# Patient Record
Sex: FEMALE | Race: WHITE | NOT HISPANIC OR LATINO | Employment: OTHER | URBAN - NONMETROPOLITAN AREA
[De-identification: names, ages, dates, MRNs, and addresses within clinical notes are randomized per-mention and may not be internally consistent; named-entity substitution may affect disease eponyms.]

---

## 2023-03-10 ENCOUNTER — TELEPHONE (OUTPATIENT)
Dept: PRIMARY CARE | Facility: CLINIC | Age: 73
End: 2023-03-10
Payer: MEDICARE

## 2023-03-10 DIAGNOSIS — M54.32 SCIATICA OF LEFT SIDE: Primary | ICD-10-CM

## 2023-03-10 NOTE — TELEPHONE ENCOUNTER
Patient called and states that she needs her gabapentin refilled, this is to be sent over to [facility]

## 2023-03-12 PROBLEM — M25.50 JOINT PAIN: Status: ACTIVE | Noted: 2023-03-12

## 2023-03-12 PROBLEM — M54.30 SCIATICA: Status: ACTIVE | Noted: 2023-03-12

## 2023-03-12 RX ORDER — GABAPENTIN 100 MG/1
100 CAPSULE ORAL 2 TIMES DAILY
COMMUNITY
Start: 2020-09-30 | End: 2023-03-12 | Stop reason: SDUPTHER

## 2023-03-12 RX ORDER — GABAPENTIN 100 MG/1
CAPSULE ORAL
Qty: 120 CAPSULE | Refills: 3 | Status: SHIPPED | OUTPATIENT
Start: 2023-03-12 | End: 2023-07-03 | Stop reason: SDUPTHER

## 2023-03-22 PROBLEM — N39.41 URINARY INCONTINENCE, URGE: Status: ACTIVE | Noted: 2023-03-22

## 2023-03-22 PROBLEM — R39.9 UTI SYMPTOMS: Status: ACTIVE | Noted: 2023-03-22

## 2023-03-22 PROBLEM — E66.9 CLASS 1 OBESITY WITH BODY MASS INDEX (BMI) OF 33.0 TO 33.9 IN ADULT: Status: ACTIVE | Noted: 2023-03-22

## 2023-03-22 PROBLEM — R06.2 WHEEZING: Status: ACTIVE | Noted: 2023-03-22

## 2023-03-22 PROBLEM — J02.9 SORE THROAT: Status: ACTIVE | Noted: 2023-03-22

## 2023-03-22 PROBLEM — R30.0 BURNING WITH URINATION: Status: ACTIVE | Noted: 2023-03-22

## 2023-03-22 PROBLEM — M85.80 OSTEOPENIA: Status: ACTIVE | Noted: 2023-03-22

## 2023-03-22 PROBLEM — B37.31 YEAST VAGINITIS: Status: ACTIVE | Noted: 2023-03-22

## 2023-03-22 PROBLEM — R76.8 POSITIVE SM/RNP ANTIBODY: Status: ACTIVE | Noted: 2023-03-22

## 2023-03-22 PROBLEM — R10.2 PELVIC PRESSURE IN FEMALE: Status: ACTIVE | Noted: 2023-03-22

## 2023-03-22 PROBLEM — I48.0 PAROXYSMAL ATRIAL FIBRILLATION (MULTI): Status: ACTIVE | Noted: 2023-03-22

## 2023-03-22 PROBLEM — F41.9 ANXIETY: Status: ACTIVE | Noted: 2023-03-22

## 2023-03-22 PROBLEM — I48.91 A-FIB (MULTI): Status: ACTIVE | Noted: 2023-03-22

## 2023-03-22 PROBLEM — K21.9 ESOPHAGEAL REFLUX: Status: ACTIVE | Noted: 2023-03-22

## 2023-03-22 PROBLEM — R31.9 HEMATURIA: Status: ACTIVE | Noted: 2023-03-22

## 2023-03-22 PROBLEM — E66.811 CLASS 1 OBESITY WITH BODY MASS INDEX (BMI) OF 33.0 TO 33.9 IN ADULT: Status: ACTIVE | Noted: 2023-03-22

## 2023-03-22 PROBLEM — E78.5 HYPERLIPIDEMIA: Status: ACTIVE | Noted: 2023-03-22

## 2023-03-22 PROBLEM — R39.15 URINARY URGENCY: Status: ACTIVE | Noted: 2023-03-22

## 2023-03-22 PROBLEM — H65.90 SOM (SEROUS OTITIS MEDIA): Status: ACTIVE | Noted: 2023-03-22

## 2023-03-22 RX ORDER — LEVOCETIRIZINE DIHYDROCHLORIDE 5 MG/1
5 TABLET, FILM COATED ORAL
COMMUNITY
Start: 2021-08-20

## 2023-03-22 RX ORDER — VITAMIN E (DL,TOCOPHERYL ACET) 90 MG
400 CAPSULE ORAL
COMMUNITY

## 2023-03-22 RX ORDER — PSYLLIUM HUSK 3.4 G/12G
1 POWDER ORAL DAILY
COMMUNITY
Start: 2022-09-26

## 2023-03-22 RX ORDER — ALBUTEROL SULFATE 90 UG/1
2 AEROSOL, METERED RESPIRATORY (INHALATION) EVERY 4 HOURS PRN
COMMUNITY
Start: 2013-09-13

## 2023-03-22 RX ORDER — WARFARIN 1 MG/1
2 TABLET ORAL DAILY
COMMUNITY
Start: 2021-10-26 | End: 2023-05-15

## 2023-03-22 RX ORDER — MIRABEGRON 50 MG/1
50 TABLET, FILM COATED, EXTENDED RELEASE ORAL NIGHTLY
COMMUNITY
Start: 2023-02-21 | End: 2023-12-14 | Stop reason: SDUPTHER

## 2023-03-22 RX ORDER — FLUTICASONE PROPIONATE 50 MCG
SPRAY, SUSPENSION (ML) NASAL
COMMUNITY
Start: 2012-11-02 | End: 2023-12-18 | Stop reason: SDUPTHER

## 2023-03-22 RX ORDER — WARFARIN 6 MG/1
6 TABLET ORAL DAILY
COMMUNITY
Start: 2020-05-13 | End: 2023-08-22 | Stop reason: SDUPTHER

## 2023-03-22 RX ORDER — LOVASTATIN 40 MG/1
40 TABLET ORAL NIGHTLY
COMMUNITY
Start: 2013-08-09 | End: 2023-08-22 | Stop reason: SDUPTHER

## 2023-03-22 RX ORDER — PANTOPRAZOLE SODIUM 40 MG/1
40 TABLET, DELAYED RELEASE ORAL
COMMUNITY
Start: 2013-09-27 | End: 2023-08-22 | Stop reason: SDUPTHER

## 2023-03-22 RX ORDER — ESTRADIOL 0.1 MG/G
1 CREAM VAGINAL NIGHTLY
COMMUNITY
Start: 2022-09-26 | End: 2024-05-02 | Stop reason: ALTCHOICE

## 2023-03-22 RX ORDER — ESCITALOPRAM OXALATE 10 MG/1
10 TABLET ORAL NIGHTLY
COMMUNITY
Start: 2020-08-10 | End: 2023-07-03 | Stop reason: SDUPTHER

## 2023-03-22 RX ORDER — PSYLLIUM HUSK 0.4 G
1 CAPSULE ORAL
COMMUNITY
Start: 2020-09-30

## 2023-03-28 ENCOUNTER — CLINICAL SUPPORT (OUTPATIENT)
Dept: PRIMARY CARE | Facility: CLINIC | Age: 73
End: 2023-03-28
Payer: COMMERCIAL

## 2023-03-28 DIAGNOSIS — I48.91 ATRIAL FIBRILLATION, UNSPECIFIED TYPE (MULTI): Primary | ICD-10-CM

## 2023-03-28 LAB
POC INR: 2.7 (ref 0.9–1.1)
POC PROTHROMBIN TIME: 2.7 (ref 9.3–12.5)

## 2023-03-28 PROCEDURE — 85610 PROTHROMBIN TIME: CPT | Performed by: NURSE PRACTITIONER

## 2023-04-25 ENCOUNTER — CLINICAL SUPPORT (OUTPATIENT)
Dept: PRIMARY CARE | Facility: CLINIC | Age: 73
End: 2023-04-25
Payer: MEDICARE

## 2023-04-25 LAB
POC INR: 1.8
POC PROTHROMBIN TIME: NORMAL

## 2023-04-25 PROCEDURE — 85610 PROTHROMBIN TIME: CPT | Performed by: INTERNAL MEDICINE

## 2023-05-04 ENCOUNTER — CLINICAL SUPPORT (OUTPATIENT)
Dept: PRIMARY CARE | Facility: CLINIC | Age: 73
End: 2023-05-04
Payer: MEDICARE

## 2023-05-04 LAB
POC INR: 2.3
POC PROTHROMBIN TIME: NORMAL

## 2023-05-04 PROCEDURE — 85610 PROTHROMBIN TIME: CPT | Performed by: INTERNAL MEDICINE

## 2023-05-13 DIAGNOSIS — I48.0 PAROXYSMAL ATRIAL FIBRILLATION (MULTI): Primary | ICD-10-CM

## 2023-05-15 RX ORDER — WARFARIN 1 MG/1
TABLET ORAL
Qty: 180 TABLET | Refills: 3 | Status: SHIPPED | OUTPATIENT
Start: 2023-05-15 | End: 2023-12-18 | Stop reason: SDUPTHER

## 2023-06-05 ENCOUNTER — APPOINTMENT (OUTPATIENT)
Dept: PRIMARY CARE | Facility: CLINIC | Age: 73
End: 2023-06-05
Payer: MEDICARE

## 2023-07-03 DIAGNOSIS — F41.9 ANXIETY: Primary | ICD-10-CM

## 2023-07-03 DIAGNOSIS — M54.32 SCIATICA OF LEFT SIDE: ICD-10-CM

## 2023-07-04 RX ORDER — ESCITALOPRAM OXALATE 10 MG/1
10 TABLET ORAL NIGHTLY
Qty: 90 TABLET | Refills: 3 | Status: SHIPPED | OUTPATIENT
Start: 2023-07-04 | End: 2024-07-03

## 2023-07-04 RX ORDER — GABAPENTIN 100 MG/1
CAPSULE ORAL
Qty: 120 CAPSULE | Refills: 3 | Status: SHIPPED | OUTPATIENT
Start: 2023-07-04 | End: 2024-05-22 | Stop reason: SDUPTHER

## 2023-07-10 ENCOUNTER — APPOINTMENT (OUTPATIENT)
Dept: PRIMARY CARE | Facility: CLINIC | Age: 73
End: 2023-07-10
Payer: MEDICARE

## 2023-07-24 PROBLEM — J01.90 ACUTE SINUSITIS: Status: RESOLVED | Noted: 2023-07-24 | Resolved: 2023-07-24

## 2023-07-24 PROBLEM — J06.9 ACUTE UPPER RESPIRATORY INFECTION: Status: RESOLVED | Noted: 2023-07-24 | Resolved: 2023-07-24

## 2023-07-24 PROBLEM — R06.02 SOB (SHORTNESS OF BREATH) ON EXERTION: Status: ACTIVE | Noted: 2023-07-24

## 2023-07-25 ENCOUNTER — OFFICE VISIT (OUTPATIENT)
Dept: PRIMARY CARE | Facility: CLINIC | Age: 73
End: 2023-07-25
Payer: MEDICARE

## 2023-07-25 ENCOUNTER — TELEPHONE (OUTPATIENT)
Dept: PRIMARY CARE | Facility: CLINIC | Age: 73
End: 2023-07-25

## 2023-07-25 ENCOUNTER — LAB (OUTPATIENT)
Dept: LAB | Facility: LAB | Age: 73
End: 2023-07-25
Payer: MEDICARE

## 2023-07-25 VITALS
DIASTOLIC BLOOD PRESSURE: 72 MMHG | HEIGHT: 63 IN | SYSTOLIC BLOOD PRESSURE: 124 MMHG | OXYGEN SATURATION: 97 % | BODY MASS INDEX: 32.28 KG/M2 | HEART RATE: 67 BPM | WEIGHT: 182.2 LBS

## 2023-07-25 DIAGNOSIS — E78.2 MIXED HYPERLIPIDEMIA: ICD-10-CM

## 2023-07-25 DIAGNOSIS — K21.00 GASTROESOPHAGEAL REFLUX DISEASE WITH ESOPHAGITIS WITHOUT HEMORRHAGE: ICD-10-CM

## 2023-07-25 DIAGNOSIS — Z12.31 VISIT FOR SCREENING MAMMOGRAM: ICD-10-CM

## 2023-07-25 DIAGNOSIS — M25.50 PAIN IN JOINT, MULTIPLE SITES: ICD-10-CM

## 2023-07-25 DIAGNOSIS — E66.09 CLASS 1 OBESITY DUE TO EXCESS CALORIES WITH SERIOUS COMORBIDITY AND BODY MASS INDEX (BMI) OF 32.0 TO 32.9 IN ADULT: ICD-10-CM

## 2023-07-25 DIAGNOSIS — Z79.01 ANTICOAGULANT LONG-TERM USE: ICD-10-CM

## 2023-07-25 DIAGNOSIS — Z78.0 POST-MENOPAUSAL: ICD-10-CM

## 2023-07-25 DIAGNOSIS — F41.9 ANXIETY: ICD-10-CM

## 2023-07-25 DIAGNOSIS — I48.91 ATRIAL FIBRILLATION, UNSPECIFIED TYPE (MULTI): Primary | ICD-10-CM

## 2023-07-25 DIAGNOSIS — Z78.0 MENOPAUSE: ICD-10-CM

## 2023-07-25 DIAGNOSIS — Z13.820 SCREENING FOR OSTEOPOROSIS: ICD-10-CM

## 2023-07-25 DIAGNOSIS — I48.91 ATRIAL FIBRILLATION, UNSPECIFIED TYPE (MULTI): ICD-10-CM

## 2023-07-25 PROBLEM — R39.15 URINARY URGENCY: Status: RESOLVED | Noted: 2023-03-22 | Resolved: 2023-07-25

## 2023-07-25 PROBLEM — R31.9 HEMATURIA: Status: RESOLVED | Noted: 2023-03-22 | Resolved: 2023-07-25

## 2023-07-25 PROBLEM — B37.31 YEAST VAGINITIS: Status: RESOLVED | Noted: 2023-03-22 | Resolved: 2023-07-25

## 2023-07-25 PROBLEM — J02.9 SORE THROAT: Status: RESOLVED | Noted: 2023-03-22 | Resolved: 2023-07-25

## 2023-07-25 PROBLEM — R39.9 UTI SYMPTOMS: Status: RESOLVED | Noted: 2023-03-22 | Resolved: 2023-07-25

## 2023-07-25 PROBLEM — R30.0 BURNING WITH URINATION: Status: RESOLVED | Noted: 2023-03-22 | Resolved: 2023-07-25

## 2023-07-25 PROBLEM — E66.9 CLASS 1 OBESITY WITH BODY MASS INDEX (BMI) OF 33.0 TO 33.9 IN ADULT: Status: RESOLVED | Noted: 2023-03-22 | Resolved: 2023-07-25

## 2023-07-25 PROBLEM — E66.811 CLASS 1 OBESITY WITH BODY MASS INDEX (BMI) OF 33.0 TO 33.9 IN ADULT: Status: RESOLVED | Noted: 2023-03-22 | Resolved: 2023-07-25

## 2023-07-25 PROBLEM — H65.90 SOM (SEROUS OTITIS MEDIA): Status: RESOLVED | Noted: 2023-03-22 | Resolved: 2023-07-25

## 2023-07-25 LAB
ALANINE AMINOTRANSFERASE (SGPT) (U/L) IN SER/PLAS: 15 U/L (ref 7–45)
ALBUMIN (G/DL) IN SER/PLAS: 4.2 G/DL (ref 3.4–5)
ALKALINE PHOSPHATASE (U/L) IN SER/PLAS: 79 U/L (ref 33–136)
ANION GAP IN SER/PLAS: 13 MMOL/L (ref 10–20)
ASPARTATE AMINOTRANSFERASE (SGOT) (U/L) IN SER/PLAS: 20 U/L (ref 9–39)
BILIRUBIN TOTAL (MG/DL) IN SER/PLAS: 0.5 MG/DL (ref 0–1.2)
CALCIUM (MG/DL) IN SER/PLAS: 9.6 MG/DL (ref 8.6–10.3)
CARBON DIOXIDE, TOTAL (MMOL/L) IN SER/PLAS: 29 MMOL/L (ref 21–32)
CHLORIDE (MMOL/L) IN SER/PLAS: 105 MMOL/L (ref 98–107)
CHOLESTEROL (MG/DL) IN SER/PLAS: 172 MG/DL (ref 0–199)
CHOLESTEROL IN HDL (MG/DL) IN SER/PLAS: 62.6 MG/DL
CHOLESTEROL/HDL RATIO: 2.7
CREATININE (MG/DL) IN SER/PLAS: 0.73 MG/DL (ref 0.5–1.05)
ERYTHROCYTE DISTRIBUTION WIDTH (RATIO) BY AUTOMATED COUNT: 13.4 % (ref 11.5–14.5)
ERYTHROCYTE MEAN CORPUSCULAR HEMOGLOBIN CONCENTRATION (G/DL) BY AUTOMATED: 31 G/DL (ref 32–36)
ERYTHROCYTE MEAN CORPUSCULAR VOLUME (FL) BY AUTOMATED COUNT: 94 FL (ref 80–100)
ERYTHROCYTES (10*6/UL) IN BLOOD BY AUTOMATED COUNT: 4.8 X10E12/L (ref 4–5.2)
GFR FEMALE: 88 ML/MIN/1.73M2
GLUCOSE (MG/DL) IN SER/PLAS: 83 MG/DL (ref 74–99)
HEMATOCRIT (%) IN BLOOD BY AUTOMATED COUNT: 45.1 % (ref 36–46)
HEMOGLOBIN (G/DL) IN BLOOD: 14 G/DL (ref 12–16)
LDL: 89 MG/DL (ref 0–99)
LEUKOCYTES (10*3/UL) IN BLOOD BY AUTOMATED COUNT: 5 X10E9/L (ref 4.4–11.3)
PLATELETS (10*3/UL) IN BLOOD AUTOMATED COUNT: 231 X10E9/L (ref 150–450)
POC INR: 2.5 (ref 0.9–1.1)
POC PROTHROMBIN TIME: 2.5 (ref 9.3–12.5)
POTASSIUM (MMOL/L) IN SER/PLAS: 4.3 MMOL/L (ref 3.5–5.3)
PROTEIN TOTAL: 6.3 G/DL (ref 6.4–8.2)
SODIUM (MMOL/L) IN SER/PLAS: 143 MMOL/L (ref 136–145)
TRIGLYCERIDE (MG/DL) IN SER/PLAS: 103 MG/DL (ref 0–149)
URATE (MG/DL) IN SER/PLAS: 4.8 MG/DL (ref 2.3–6.7)
UREA NITROGEN (MG/DL) IN SER/PLAS: 24 MG/DL (ref 6–23)
VLDL: 21 MG/DL (ref 0–40)

## 2023-07-25 PROCEDURE — 99215 OFFICE O/P EST HI 40 MIN: CPT | Performed by: NURSE PRACTITIONER

## 2023-07-25 PROCEDURE — 90677 PCV20 VACCINE IM: CPT | Performed by: NURSE PRACTITIONER

## 2023-07-25 PROCEDURE — 1125F AMNT PAIN NOTED PAIN PRSNT: CPT | Performed by: NURSE PRACTITIONER

## 2023-07-25 PROCEDURE — 1159F MED LIST DOCD IN RCRD: CPT | Performed by: NURSE PRACTITIONER

## 2023-07-25 PROCEDURE — 84550 ASSAY OF BLOOD/URIC ACID: CPT

## 2023-07-25 PROCEDURE — 85610 PROTHROMBIN TIME: CPT | Performed by: NURSE PRACTITIONER

## 2023-07-25 PROCEDURE — 36415 COLL VENOUS BLD VENIPUNCTURE: CPT

## 2023-07-25 PROCEDURE — 3008F BODY MASS INDEX DOCD: CPT | Performed by: NURSE PRACTITIONER

## 2023-07-25 PROCEDURE — 85027 COMPLETE CBC AUTOMATED: CPT

## 2023-07-25 PROCEDURE — 1036F TOBACCO NON-USER: CPT | Performed by: NURSE PRACTITIONER

## 2023-07-25 PROCEDURE — G0009 ADMIN PNEUMOCOCCAL VACCINE: HCPCS | Performed by: NURSE PRACTITIONER

## 2023-07-25 PROCEDURE — 80053 COMPREHEN METABOLIC PANEL: CPT

## 2023-07-25 PROCEDURE — 80061 LIPID PANEL: CPT

## 2023-07-25 ASSESSMENT — COLUMBIA-SUICIDE SEVERITY RATING SCALE - C-SSRS
2. HAVE YOU ACTUALLY HAD ANY THOUGHTS OF KILLING YOURSELF?: NO
6. HAVE YOU EVER DONE ANYTHING, STARTED TO DO ANYTHING, OR PREPARED TO DO ANYTHING TO END YOUR LIFE?: NO
1. IN THE PAST MONTH, HAVE YOU WISHED YOU WERE DEAD OR WISHED YOU COULD GO TO SLEEP AND NOT WAKE UP?: NO

## 2023-07-25 ASSESSMENT — ENCOUNTER SYMPTOMS
BACK PAIN: 1
OCCASIONAL FEELINGS OF UNSTEADINESS: 0
CARDIOVASCULAR NEGATIVE: 1
GASTROINTESTINAL NEGATIVE: 1
NEUROLOGICAL NEGATIVE: 1
CONSTITUTIONAL NEGATIVE: 1
RESPIRATORY NEGATIVE: 1
DEPRESSION: 0
PSYCHIATRIC NEGATIVE: 1
LOSS OF SENSATION IN FEET: 0

## 2023-07-25 ASSESSMENT — PATIENT HEALTH QUESTIONNAIRE - PHQ9
1. LITTLE INTEREST OR PLEASURE IN DOING THINGS: NOT AT ALL
SUM OF ALL RESPONSES TO PHQ9 QUESTIONS 1 AND 2: 0
2. FEELING DOWN, DEPRESSED OR HOPELESS: NOT AT ALL

## 2023-07-25 NOTE — TELEPHONE ENCOUNTER
Result Communication    Resulted Orders   Lipid Panel   Result Value Ref Range    Cholesterol 172 0 - 199 mg/dL      Comment:      .      AGE      DESIRABLE   BORDERLINE HIGH   HIGH     0-19 Y     0 - 169       170 - 199     >/= 200    20-24 Y     0 - 189       190 - 224     >/= 225         >24 Y     0 - 199       200 - 239     >/= 240   **All ranges are based on fasting samples. Specific   therapeutic targets will vary based on patient-specific   cardiac risk.  .   Pediatric guidelines reference:Pediatrics 2011, 128(S5).   Adult guidelines reference: NCEP ATPIII Guidelines,     STEPHEN 2001, 258:2486-97  .   Venipuncture immediately after or during the    administration of Metamizole may lead to falsely   low results. Testing should be performed immediately   prior to Metamizole dosing.    HDL 62.6 mg/dL      Comment:      .      AGE      VERY LOW   LOW     NORMAL    HIGH       0-19 Y       < 35   < 40     40-45     ----    20-24 Y       ----   < 40       >45     ----      >24 Y       ----   < 40     40-60      >60  .    Cholesterol/HDL Ratio 2.7       Comment:      REF VALUES  DESIRABLE  < 3.4  HIGH RISK  > 5.0    LDL 89 0 - 99 mg/dL      Comment:      .                           NEAR      BORD      AGE      DESIRABLE  OPTIMAL    HIGH     HIGH     VERY HIGH     0-19 Y     0 - 109     ---    110-129   >/= 130     ----    20-24 Y     0 - 119     ---    120-159   >/= 160     ----      >24 Y     0 -  99   100-129  130-159   160-189     >/=190  .    VLDL 21 0 - 40 mg/dL    Triglycerides 103 0 - 149 mg/dL      Comment:      .      AGE      DESIRABLE   BORDERLINE HIGH   HIGH     VERY HIGH   0 D-90 D    19 - 174         ----         ----        ----  91 D- 9 Y     0 -  74        75 -  99     >/= 100      ----    10-19 Y     0 -  89        90 - 129     >/= 130      ----    20-24 Y     0 - 114       115 - 149     >/= 150      ----         >24 Y     0 - 149       150 - 199    200- 499    >/= 500  .   Venipuncture immediately  after or during the    administration of Metamizole may lead to falsely   low results. Testing should be performed immediately   prior to Metamizole dosing.   Comprehensive Metabolic Panel   Result Value Ref Range    Glucose 83 74 - 99 mg/dL    Sodium 143 136 - 145 mmol/L    Potassium 4.3 3.5 - 5.3 mmol/L    Chloride 105 98 - 107 mmol/L    Bicarbonate 29 21 - 32 mmol/L    Anion Gap 13 10 - 20 mmol/L    Urea Nitrogen 24 (H) 6 - 23 mg/dL    Creatinine 0.73 0.50 - 1.05 mg/dL    GFR Female 88 >90 mL/min/1.73m2      Comment:       CALCULATIONS OF ESTIMATED GFR ARE PERFORMED   USING THE 2021 CKD-EPI STUDY REFIT EQUATION   WITHOUT THE RACE VARIABLE FOR THE IDMS-TRACEABLE   CREATININE METHODS.    https://jasn.asnjournals.org/content/early/2021/09/22/ASN.4247806637    Calcium 9.6 8.6 - 10.3 mg/dL    Albumin 4.2 3.4 - 5.0 g/dL    Alkaline Phosphatase 79 33 - 136 U/L    Total Protein 6.3 (L) 6.4 - 8.2 g/dL    AST 20 9 - 39 U/L    Total Bilirubin 0.5 0.0 - 1.2 mg/dL    ALT (SGPT) 15 7 - 45 U/L      Comment:       Patients treated with Sulfasalazine may generate    falsely decreased results for ALT.   CBC   Result Value Ref Range    WBC 5.0 4.4 - 11.3 x10E9/L    RBC 4.80 4.00 - 5.20 x10E12/L    Hemoglobin 14.0 12.0 - 16.0 g/dL    Hematocrit 45.1 36.0 - 46.0 %    MCV 94 80 - 100 fL    MCHC 31.0 (L) 32.0 - 36.0 g/dL    Platelets 231 150 - 450 x10E9/L    RDW 13.4 11.5 - 14.5 %   Uric Acid   Result Value Ref Range    Uric Acid 4.8 2.3 - 6.7 mg/dL      Comment:       Venipuncture immediately after or during the    administration of Metamizole may lead to falsely   low results. Testing should be performed immediately   prior to Metamizole dosing.       5:32 PM      Results were successfully communicated with the patient and they acknowledged their understanding.

## 2023-07-25 NOTE — TELEPHONE ENCOUNTER
----- Message from DIANA Nicholson-CNP sent at 7/25/2023  4:20 PM EDT -----  Lab resultsCMP shows dehydration.  Make sure you are drinking plenty of fluids otherwise stable.  CBC with no significant abnormalities.  Cholesterol well within normal limits continue to exercise eat healthy.  Recheck the uric acid level due to history of that right hand pain/possible gout as she was told in the past.  And is well within normal limits.

## 2023-07-25 NOTE — PATIENT INSTRUCTIONS
Please continue to remain active eat a healthy well-balanced diet.    Please continue to follow-up for your INRs    Routine labs ordered once completed we will notify of results    If you could schedule Medicare wellness before the end of the year in December.    Typically routine follow-ups to be every 6 months sooner if needed    Very nice to meet you          Current weight: 82.6 kg (182 lb 3.2 oz)  Weight change since last visit (-) denotes wt loss -3.87 lbs   Weight loss needed to achieve BMI 25: 41.4 Lbs  Weight loss needed to achieve BMI 30: 13.2 Lbs        Ways to Help Prevent Falls at Home    Quick Tips   ? Ask for help if you need it. Most people want to help!   ? Get up slowly after sitting or laying down   ? Wear a medical alert device or keep cell phone in your pocket   ? Use night lights, especially areas near a bathroom   ? Keep the items you use often within reach on a small stool or end table   ? Use an assistive device such as walker or cane, as directed by provider/physical therapy   ? Use a non-slip mat and grab bars in your bathroom. Look for home health sections for best options     Other Areas to Focus On   ? Exercise and nutrition: Regular exercise or taking a falls prevention class are great ways improve strength and balance. Don’t forget to stay hydrated and bring a snack!   ? Medicine side effects: Some medicines can make you sleepy or dizzy, which could cause a fall. Ask your healthcare provider about the side effects your medicines could cause. Be sure to let them know if you take any vitamins or supplements as well.   ? Tripping hazards: Remove items you could trip on, such as loose mats, rugs, cords, and clutter. Wear closed toe shoes with rubber soles.   ? Health and wellness: Get regular checkups with your healthcare provider, plus routine vision and hearing screenings. Talk with your healthcare provider about:   o Your medicines and the possible side effects - bring them in a bag if  that is easier!   o Problems with balance or feeling dizzy   o Ways to promote bone health, such as Vitamin D and calcium supplements   o Questions or concerns about falling     *Ask your healthcare team if you have questions     ©[facility], 2022

## 2023-07-25 NOTE — PROGRESS NOTES
"Subjective   Patient ID: Mariposa Comer is a 72 y.o. female who presents for Establish Care.    Sees Dr. Lizama for Afib, INR 2.9 7/10/23.   Cardiac Ablation- done in 2020.   Dr. Kelly- urinary incontinence  Frequent [place]/Restless Leg Syndrome- taking gabapentin   Anxiety- has Son in [place], patient is still on Lexapro. Patient is stable on medication.   EGD/Colonoscopy- 2 years ago.   Asthma- 1-2X a month for inhaler use.   Staying active,tries to eat healthy         Review of Systems   Constitutional: Negative.    HENT: Negative.     Respiratory: Negative.     Cardiovascular: Negative.    Gastrointestinal: Negative.    Genitourinary: Negative.    Musculoskeletal:  Positive for back pain.   Neurological: Negative.    Psychiatric/Behavioral: Negative.         Objective   /72   Pulse 67   Ht 1.6 m (5' 3\")   Wt 82.6 kg (182 lb 3.2 oz)   SpO2 97%   BMI 32.28 kg/m²     Physical Exam  Vitals reviewed.   Constitutional:       Appearance: Normal appearance.   HENT:      Head: Normocephalic.   Cardiovascular:      Rate and Rhythm: Normal rate. Rhythm irregular.      Heart sounds: Normal heart sounds.   Pulmonary:      Effort: Pulmonary effort is normal.      Breath sounds: Normal breath sounds.   Skin:     General: Skin is warm.   Neurological:      General: No focal deficit present.      Mental Status: She is alert and oriented to person, place, and time.         Assessment/Plan   Problem List Items Addressed This Visit       Anxiety    A-fib (CMS/[facility]) - Primary    Relevant Orders    Lipid Panel    Comprehensive Metabolic Panel    CBC    POCT INR manually resulted    Esophageal reflux    Relevant Orders    CBC    Hyperlipidemia    Relevant Orders    Lipid Panel     Other Visit Diagnoses       Class 1 obesity due to excess calories with serious comorbidity and body mass index (BMI) of 32.0 to 32.9 in adult        Relevant Orders    Lipid Panel    Pain in joint, multiple sites        Relevant Orders    Uric " Acid    Visit for screening mammogram        Relevant Orders    BI mammo bilateral screening tomosynthesis    Menopause        Relevant Orders    XR DEXA bone density    Screening for osteoporosis        Relevant Orders    XR DEXA bone density    Post-menopausal        Anticoagulant long-term use        Relevant Orders    POCT INR manually resulted        Extended time spent over 45+ minutes with patient as well as reviewing previous medical history in chart lab work.  Patient is not new to [facility] however new to me.     Patient was identified as a fall risk. Risk prevention instructions provided.

## 2023-08-22 ENCOUNTER — LAB (OUTPATIENT)
Dept: LAB | Facility: LAB | Age: 73
End: 2023-08-22
Payer: MEDICARE

## 2023-08-22 ENCOUNTER — TELEPHONE (OUTPATIENT)
Dept: PRIMARY CARE | Facility: CLINIC | Age: 73
End: 2023-08-22

## 2023-08-22 ENCOUNTER — CLINICAL SUPPORT (OUTPATIENT)
Dept: PRIMARY CARE | Facility: CLINIC | Age: 73
End: 2023-08-22
Payer: MEDICARE

## 2023-08-22 DIAGNOSIS — I48.0 PAROXYSMAL ATRIAL FIBRILLATION (MULTI): ICD-10-CM

## 2023-08-22 DIAGNOSIS — I48.91 ATRIAL FIBRILLATION, UNSPECIFIED TYPE (MULTI): ICD-10-CM

## 2023-08-22 DIAGNOSIS — E78.5 HYPERLIPIDEMIA, UNSPECIFIED HYPERLIPIDEMIA TYPE: ICD-10-CM

## 2023-08-22 DIAGNOSIS — K21.00 GASTROESOPHAGEAL REFLUX DISEASE WITH ESOPHAGITIS WITHOUT HEMORRHAGE: ICD-10-CM

## 2023-08-22 LAB
POC INR: 2.8
POC PROTHROMBIN TIME: NORMAL

## 2023-08-22 PROCEDURE — 85610 PROTHROMBIN TIME: CPT | Performed by: NURSE PRACTITIONER

## 2023-08-22 RX ORDER — PANTOPRAZOLE SODIUM 40 MG/1
40 TABLET, DELAYED RELEASE ORAL
Qty: 90 TABLET | Refills: 1 | Status: SHIPPED | OUTPATIENT
Start: 2023-08-22 | End: 2023-12-18 | Stop reason: SDUPTHER

## 2023-08-22 RX ORDER — WARFARIN 6 MG/1
TABLET ORAL
Qty: 45 TABLET | Refills: 2 | Status: SHIPPED | OUTPATIENT
Start: 2023-08-22 | End: 2023-12-18 | Stop reason: SDUPTHER

## 2023-08-22 RX ORDER — LOVASTATIN 40 MG/1
40 TABLET ORAL NIGHTLY
Qty: 90 TABLET | Refills: 1 | Status: SHIPPED | OUTPATIENT
Start: 2023-08-22 | End: 2023-12-18 | Stop reason: SDUPTHER

## 2023-08-22 NOTE — PATIENT INSTRUCTIONS
Coumadin (warfarin) is an anticoagulant, or blood thinner, that works by inhibiting the action of vitamin K-dependent clotting factors in the blood. The effectiveness of warfarin is monitored using the International Normalized Ratio (INR). This is a blood test that measures the time it takes for blood to clot.    When on warfarin, it's essential to maintain a consistent intake of vitamin K, as it can influence the INR. Sudden increases or decreases in vitamin K intake can lead to changes in INR, potentially making the blood too thin (high INR) or not thin enough (low INR).    Dietary Recommendations for Patients on Warfarin (Coumadin):    **Maintain a consistent intake of vitamin K  **: This doesn't mean avoiding vitamin K-rich foods but rather trying to keep your daily intake about the same.    Foods high in vitamin K:    Green leafy vegetables such as kale, spinach, turnip greens, collards, Swiss chard, mustard greens, and parsley.  Broccoli, [place] sprouts, and cabbage.  Some vegetable oils, like canola and soybean oil.  Green tea.  Foods with moderate vitamin K content:    Lettuce, blueberries, okra, cauliflower, and kiwi.  Soybeans and soy products.  Be cautious with alcohol: It can increase the effect of warfarin, leading to a higher INR. It's recommended to limit alcohol or discuss its consumption with your healthcare provider.    Be aware of herbal supplements: Many supplements can interact with warfarin. For example, cranberry, garlic, gingko, and ginseng can potentially affect INR. Always discuss any supplements or herbal products with your healthcare provider.    Check with your doctor when adding new medications: Both prescription and over-the-counter medications can interact with warfarin. For instance, medications like aspirin, NSAIDs (like ibuprofen), certain antibiotics, and many others can affect how warfarin works.    If INR is too high (indicating the blood is too thin):    This increases the  risk of bleeding. Your doctor might adjust your warfarin dosage or ask about any recent changes in diet, supplements, or medications.  Be extra cautious to avoid injuries, and be vigilant for signs of bleeding, such as unusual bruising, prolonged bleeding from cuts, bloody stools or urine, or prolonged nosebleeds.  If INR is too low (indicating the blood is not thin enough):    This increases the risk of clot formation. As with a high INR, your doctor may adjust your warfarin dose or investigate potential causes like changes in diet, supplements, or other medications.  Be vigilant for signs of a blood clot, such as unexplained swelling, pain, skin discoloration, or difficulty breathing.  Consistent communication with your healthcare provider is vital. Regular monitoring and adjustments, as well as attention to diet and medications, can help keep INR levels within the desired range and reduce the risks associated with too high or too low INR.    Please follow up in 4 weeks for repeat INR.

## 2023-08-22 NOTE — TELEPHONE ENCOUNTER
Rx Refill Request Telephone Encounter    Name:  Mariposa Comer  :  [id number]  Medication Name:    LOVASTATIN 40MG Q/D   PANTOPRAZOLE 40MG Q/D   WARFARIN 6MG 1/2 PILL Q/D 90 DAY SUPPLY    Specific Pharmacy location:  [facility]   Date of last appointment:  2023  Date of next appointment:  2023  Best number to reach patient:  [phone]

## 2023-08-30 LAB — URINE CULTURE: ABNORMAL

## 2023-09-21 ENCOUNTER — CLINICAL SUPPORT (OUTPATIENT)
Dept: PRIMARY CARE | Facility: CLINIC | Age: 73
End: 2023-09-21
Payer: MEDICARE

## 2023-09-21 DIAGNOSIS — I48.0 PAROXYSMAL ATRIAL FIBRILLATION (MULTI): ICD-10-CM

## 2023-09-21 LAB
POC INR: 3.6
POC PROTHROMBIN TIME: NORMAL

## 2023-09-21 PROCEDURE — 85610 PROTHROMBIN TIME: CPT | Performed by: NURSE PRACTITIONER

## 2023-09-21 NOTE — PROGRESS NOTES
Coumadin (warfarin) is an anticoagulant, or blood thinner, that works by inhibiting the action of vitamin K-dependent clotting factors in the blood. The effectiveness of warfarin is monitored using the International Normalized Ratio (INR). This is a blood test that measures the time it takes for blood to clot.     When on warfarin, it's essential to maintain a consistent intake of vitamin K, as it can influence the INR. Sudden increases or decreases in vitamin K intake can lead to changes in INR, potentially making the blood too thin (high INR) or not thin enough (low INR).     Dietary Recommendations for Patients on Warfarin (Coumadin):     **Maintain a consistent intake of vitamin K  **: This doesn't mean avoiding vitamin K-rich foods but rather trying to keep your daily intake about the same.     Foods high in vitamin K:     Green leafy vegetables such as kale, spinach, turnip greens, collards, Swiss chard, mustard greens, and parsley.  Broccoli, [place] sprouts, and cabbage.  Some vegetable oils, like canola and soybean oil.  Green tea.  Foods with moderate vitamin K content:     Lettuce, blueberries, okra, cauliflower, and kiwi.  Soybeans and soy products.  Be cautious with alcohol: It can increase the effect of warfarin, leading to a higher INR. It's recommended to limit alcohol or discuss its consumption with your healthcare provider.     Be aware of herbal supplements: Many supplements can interact with warfarin. For example, cranberry, garlic, gingko, and ginseng can potentially affect INR. Always discuss any supplements or herbal products with your healthcare provider.     Check with your doctor when adding new medications: Both prescription and over-the-counter medications can interact with warfarin. For instance, medications like aspirin, NSAIDs (like ibuprofen), certain antibiotics, and many others can affect how warfarin works.     If INR is too high (indicating the blood is too thin):     This  increases the risk of bleeding. Your doctor might adjust your warfarin dosage or ask about any recent changes in diet, supplements, or medications.  Be extra cautious to avoid injuries, and be vigilant for signs of bleeding, such as unusual bruising, prolonged bleeding from cuts, bloody stools or urine, or prolonged nosebleeds.  If INR is too low (indicating the blood is not thin enough):     This increases the risk of clot formation. As with a high INR, your doctor may adjust your warfarin dose or investigate potential causes like changes in diet, supplements, or other medications.  Be vigilant for signs of a blood clot, such as unexplained swelling, pain, skin discoloration, or difficulty breathing.  Consistent communication with your healthcare provider is vital. Regular monitoring and adjustments, as well as attention to diet and medications, can help keep INR levels within the desired range and reduce the risks associated with too high or too low INR.     Please follow up on Monday 9/25 for repeat INR.

## 2023-09-25 ENCOUNTER — CLINICAL SUPPORT (OUTPATIENT)
Dept: PRIMARY CARE | Facility: CLINIC | Age: 73
End: 2023-09-25
Payer: MEDICARE

## 2023-09-25 DIAGNOSIS — I48.0 PAROXYSMAL ATRIAL FIBRILLATION (MULTI): ICD-10-CM

## 2023-09-25 LAB
POC INR: 1.2
POC PROTHROMBIN TIME: NORMAL

## 2023-09-25 PROCEDURE — 85610 PROTHROMBIN TIME: CPT | Performed by: NURSE PRACTITIONER

## 2023-09-28 ENCOUNTER — CLINICAL SUPPORT (OUTPATIENT)
Dept: PRIMARY CARE | Facility: CLINIC | Age: 73
End: 2023-09-28
Payer: MEDICARE

## 2023-09-28 DIAGNOSIS — I48.91 ATRIAL FIBRILLATION, UNSPECIFIED TYPE (MULTI): ICD-10-CM

## 2023-09-28 LAB
POC INR: 1.7
POC PROTHROMBIN TIME: NORMAL

## 2023-09-28 PROCEDURE — 85610 PROTHROMBIN TIME: CPT | Performed by: NURSE PRACTITIONER

## 2023-10-06 ENCOUNTER — CLINICAL SUPPORT (OUTPATIENT)
Dept: PRIMARY CARE | Facility: CLINIC | Age: 73
End: 2023-10-06
Payer: MEDICARE

## 2023-10-06 DIAGNOSIS — I48.0 PAROXYSMAL ATRIAL FIBRILLATION (MULTI): ICD-10-CM

## 2023-10-06 LAB
POC INR: 3.2 (ref 2–3)
POC PROTHROMBIN TIME: ABNORMAL

## 2023-10-16 ENCOUNTER — CLINICAL SUPPORT (OUTPATIENT)
Dept: PRIMARY CARE | Facility: CLINIC | Age: 73
End: 2023-10-16
Payer: MEDICARE

## 2023-10-16 DIAGNOSIS — I48.91 ATRIAL FIBRILLATION, UNSPECIFIED TYPE (MULTI): ICD-10-CM

## 2023-10-16 LAB
POC INR: 2
POC PROTHROMBIN TIME: NORMAL

## 2023-10-16 PROCEDURE — 85610 PROTHROMBIN TIME: CPT | Performed by: NURSE PRACTITIONER

## 2023-10-20 ENCOUNTER — TELEMEDICINE (OUTPATIENT)
Dept: PRIMARY CARE | Facility: CLINIC | Age: 73
End: 2023-10-20
Payer: MEDICARE

## 2023-10-20 VITALS — OXYGEN SATURATION: 96 % | BODY MASS INDEX: 31.89 KG/M2 | WEIGHT: 180 LBS | TEMPERATURE: 100 F | HEART RATE: 63 BPM

## 2023-10-20 DIAGNOSIS — U07.1 COVID-19: Primary | ICD-10-CM

## 2023-10-20 PROCEDURE — 99212 OFFICE O/P EST SF 10 MIN: CPT | Performed by: FAMILY MEDICINE

## 2023-10-20 RX ORDER — NIRMATRELVIR AND RITONAVIR 300-100 MG
3 KIT ORAL 2 TIMES DAILY
Qty: 30 TABLET | Refills: 0 | Status: SHIPPED | OUTPATIENT
Start: 2023-10-20 | End: 2023-10-26 | Stop reason: ALTCHOICE

## 2023-10-20 ASSESSMENT — ENCOUNTER SYMPTOMS
SHORTNESS OF BREATH: 0
BLOOD IN STOOL: 0
SORE THROAT: 1
VOMITING: 0
TROUBLE SWALLOWING: 0
ABDOMINAL PAIN: 0
NAUSEA: 0
WEAKNESS: 0
DIARRHEA: 0
CONFUSION: 0
DIFFICULTY URINATING: 0
RHINORRHEA: 1
DIZZINESS: 0
LIGHT-HEADEDNESS: 0
DYSURIA: 0
FEVER: 1
WHEEZING: 0
FATIGUE: 1
COUGH: 1
NUMBNESS: 0
CHILLS: 0
UNEXPECTED WEIGHT CHANGE: 0

## 2023-10-20 NOTE — ASSESSMENT & PLAN NOTE
Mild illness.  Day 1, wishes to use Paxlovid. Discussed EUA status and drug interactions and precautions. Last dose of Lovastatin was more than 12 hours ago, last night, and advised her to avoid taking Lovastatin while taking Paxlovid, and for least 5 days after completing Paxlovid.  Skip tonight's dose of warfarin, have INR checked after 3 days.

## 2023-10-20 NOTE — PATIENT INSTRUCTIONS
Do not take Lovastatin within 12 hours of your first dose of Paxlovid, or within 5 days after your last dose of Paxlovid.    Skip tonight's dose of Warfarin, and have your INR checked after you have been taking Paxlovid for 3 days.    If taken, Paxlovid should be started within 5 days of the onset of symptoms. Availability and eligibility criteria are subject to change. Please carefully review the fact sheets and drug interactions prior to taking Paxlovid.    Paxlovid (nirmatrelvir and ritonavir tablets)  https://labeling.[employer].com/ShowLabeling.aspx?qe=15944  https://www.fda.gov/media/056830/download    Drug interaction check  https://ackqm14-fbzkkqxuiakrblps.org/     You should seek immediate medical attention or call 911 if you have any new or worsening shortness of breath, difficulty breathing, chest/side/back pain, pain or swelling in your arms or legs, or other concern.    Isolation may be discontinued under the following conditions:  1 - At least 5 days have passed since symptom onset (or since testing positive if asymptomatic) and symptoms are improving, AND  2 - At least 24 hours have passed since resolution of fever without the use of fever-reducing medications, AND  3 - A mask is worn for at least 10 days from onset of symptoms or testing positive if asymptomatic.    A limited number of persons with severe illness may produce replication-competent virus beyond 10 days that may warrant extending duration of isolation and precautions.    Information and recommendations regarding COVID-19 may change at any time. Please refer to the CDC for additional information.  https://www.cdc.gov/coronavirus/2019-ncov/if-you-are-sick/quarantine.html

## 2023-10-20 NOTE — PROGRESS NOTES
Subjective   Mariposa Comer is a 72 y.o. female who presents for covid positive (Pt presents in virtual visit, pt tested positive for Covid this morning, cough, runny stuffy nose, fatigue, low grade temp.BL).    HPI    Symptoms started yesterday. Denies chest/side/back pain, pain with deep inhalation, SOB, difficulty breathing, leg pain/swelling/bruising/redness.     Tried Sudafed, sore throat spray, help a little bit.    Review of Systems   Constitutional:  Positive for fatigue and fever. Negative for chills and unexpected weight change.   HENT:  Positive for congestion, rhinorrhea and sore throat. Negative for ear pain and trouble swallowing.    Respiratory:  Positive for cough. Negative for shortness of breath and wheezing.    Cardiovascular:  Negative for chest pain.   Gastrointestinal:  Negative for abdominal pain, blood in stool, diarrhea, nausea and vomiting.   Genitourinary:  Negative for difficulty urinating and dysuria.   Skin:  Negative for rash.   Neurological:  Negative for dizziness, syncope, weakness, light-headedness and numbness.   Psychiatric/Behavioral:  Negative for behavioral problems and confusion.        Objective   Physical Exam  Within the limits of a virtual encounter, patient appears to be in no acute distress.     Assessment/Plan   Problem List Items Addressed This Visit             ICD-10-CM    COVID-19 - Primary U07.1     Mild illness.  Day 1, wishes to use Paxlovid. Discussed EUA status and drug interactions and precautions. Last dose of Lovastatin was more than 12 hours ago, last night, and advised her to avoid taking Lovastatin while taking Paxlovid, and for least 5 days after completing Paxlovid.  Skip tonight's dose of warfarin, have INR checked after 3 days.         Relevant Medications    nirmatrelvir-ritonavir (Paxlovid) 300 mg (150 mg x 2)-100 mg tablet therapy pack       This visit was completed via videoconference due to the restrictions of the COVID-19 pandemic. All issues  as above were discussed and addressed but no physical exam was performed. If it was felt that the patient should be evaluated in clinic then they were directed there. The patient verbally consented to this visit.  Spent 15 minutes with patient face-to-face and more than 50% of this time was spent in counseling and coordination of care.      Minh Lopez, DO

## 2023-10-23 ENCOUNTER — TELEPHONE (OUTPATIENT)
Dept: PRIMARY CARE | Facility: CLINIC | Age: 73
End: 2023-10-23
Payer: MEDICARE

## 2023-10-25 ENCOUNTER — APPOINTMENT (OUTPATIENT)
Dept: PRIMARY CARE | Facility: CLINIC | Age: 73
End: 2023-10-25
Payer: MEDICARE

## 2023-10-26 ENCOUNTER — CLINICAL SUPPORT (OUTPATIENT)
Dept: PRIMARY CARE | Facility: CLINIC | Age: 73
End: 2023-10-26
Payer: MEDICARE

## 2023-10-26 DIAGNOSIS — I48.0 PAROXYSMAL ATRIAL FIBRILLATION (MULTI): ICD-10-CM

## 2023-10-26 LAB
POC INR: 1.1
POC PROTHROMBIN TIME: NORMAL

## 2023-10-26 PROCEDURE — 85610 PROTHROMBIN TIME: CPT | Performed by: NURSE PRACTITIONER

## 2023-10-30 ENCOUNTER — CLINICAL SUPPORT (OUTPATIENT)
Dept: PRIMARY CARE | Facility: CLINIC | Age: 73
End: 2023-10-30
Payer: MEDICARE

## 2023-10-30 DIAGNOSIS — I48.0 PAROXYSMAL ATRIAL FIBRILLATION (MULTI): ICD-10-CM

## 2023-10-30 LAB
POC INR: 1.3
POC PROTHROMBIN TIME: NORMAL

## 2023-10-30 PROCEDURE — 85610 PROTHROMBIN TIME: CPT | Performed by: NURSE PRACTITIONER

## 2023-11-06 ENCOUNTER — CLINICAL SUPPORT (OUTPATIENT)
Dept: PRIMARY CARE | Facility: CLINIC | Age: 73
End: 2023-11-06
Payer: MEDICARE

## 2023-11-06 DIAGNOSIS — I48.0 PAROXYSMAL ATRIAL FIBRILLATION (MULTI): ICD-10-CM

## 2023-11-06 LAB
POC INR: 3.4
POC PROTHROMBIN TIME: 0 (ref 9.3–12.5)

## 2023-11-16 ENCOUNTER — OFFICE VISIT (OUTPATIENT)
Dept: UROLOGY | Facility: CLINIC | Age: 73
End: 2023-11-16
Payer: MEDICARE

## 2023-11-16 DIAGNOSIS — N32.81 OAB (OVERACTIVE BLADDER): Primary | ICD-10-CM

## 2023-11-16 DIAGNOSIS — N95.8 GENITOURINARY SYNDROME OF MENOPAUSE: ICD-10-CM

## 2023-11-16 DIAGNOSIS — R15.9 INCONTINENCE OF FECES, UNSPECIFIED FECAL INCONTINENCE TYPE: ICD-10-CM

## 2023-11-16 PROCEDURE — 1125F AMNT PAIN NOTED PAIN PRSNT: CPT | Performed by: STUDENT IN AN ORGANIZED HEALTH CARE EDUCATION/TRAINING PROGRAM

## 2023-11-16 PROCEDURE — 1159F MED LIST DOCD IN RCRD: CPT | Performed by: STUDENT IN AN ORGANIZED HEALTH CARE EDUCATION/TRAINING PROGRAM

## 2023-11-16 PROCEDURE — 1036F TOBACCO NON-USER: CPT | Performed by: STUDENT IN AN ORGANIZED HEALTH CARE EDUCATION/TRAINING PROGRAM

## 2023-11-16 PROCEDURE — 99213 OFFICE O/P EST LOW 20 MIN: CPT | Performed by: STUDENT IN AN ORGANIZED HEALTH CARE EDUCATION/TRAINING PROGRAM

## 2023-11-16 PROCEDURE — 3008F BODY MASS INDEX DOCD: CPT | Performed by: STUDENT IN AN ORGANIZED HEALTH CARE EDUCATION/TRAINING PROGRAM

## 2023-11-16 NOTE — PROGRESS NOTES
CHIEF COMPLAINT:  FUV           HISTORY OF PRESENT ILLNESS:  This is a 72 y.o. female,  who presents with a follow up visit.            HISTORY OF PRESENT ILLNESS:           Past Medical History  She has a past medical history of Acute upper respiratory infection (07/24/2023), Asymptomatic menopausal state, Encounter for full-term uncomplicated delivery, Malignant neoplasm of connective and soft tissue of left lower limb, including hip (CMS/HCC), Menopausal and female climacteric states, Nausea, Old myocardial infarction, Personal history of other benign neoplasm, Personal history of other diseases of the digestive system, Personal history of other diseases of the respiratory system, Personal history of pneumonia (recurrent), Sore throat (03/22/2023), and Unspecified ovarian cyst, unspecified side.    Surgical History  She has a past surgical history that includes Cholecystectomy (07/22/2014); Tonsillectomy (07/22/2014); Tubal ligation (07/22/2014); Colonoscopy (07/22/2014); Other surgical history (07/22/2014); Other surgical history (08/20/2021); Other surgical history (05/13/2020); Other surgical history (04/07/2020); Other surgical history (04/07/2020); Other surgical history (04/07/2020); and Other surgical history (01/06/2020).     Social History  She reports that she has never smoked. She has never used smokeless tobacco. She reports that she does not drink alcohol and does not use drugs.    Family History  Family History   Problem Relation Name Age of Onset    Hyperlipidemia Mother      Hypertension Mother      Other (Cardiac disorder) Mother      Heart attack Mother      Raynaud syndrome Mother      Sjogren's syndrome Mother      Heart attack Father      Other (Other) Father          Allergies  Omeprazole and Tramadol      A comprehensive 10+ review of systems was negative except for: see hpi                          PHYSICAL EXAMINATION:  BP Readings from Last 3 Encounters:   07/25/23 124/72   05/03/23  "122/67   03/09/23 132/76      Wt Readings from Last 3 Encounters:   10/20/23 81.6 kg (180 lb)   07/25/23 82.6 kg (182 lb 3.2 oz)   05/03/23 84.4 kg (186 lb 1.1 oz)      BMI: Estimated body mass index is 31.89 kg/m² as calculated from the following:    Height as of 7/25/23: 1.6 m (5' 3\").    Weight as of 10/20/23: 81.6 kg (180 lb).  BSA: Estimated body surface area is 1.9 meters squared as calculated from the following:    Height as of 7/25/23: 1.6 m (5' 3\").    Weight as of 10/20/23: 81.6 kg (180 lb).  HEENT: Normocephalic, atraumatic, PER EOMI, nonicteric, trachea normal, thyroid normal, oropharynx normal.  CARDIAC: regular rate & rhythm, S1 & S2 normal.  No heaves, thrills, gallops or murmurs.  LUNGS: Clear to auscultation, no spinal or CV tenderness.  EXTREMITIES: No evidence of cyanosis, clubbing or edema.               Assessment:    72-year-old with OAB, urge incontinence, and fecal incontinence with constipation and genitourinary syndrome of menopause     GUSM:  Continue using estradiol cream     FI:   Recommend taking fiber supplement Metamucil  Discussed options for sacral neuromodulation     OAB/UUI  Drink no more than 8 oz. of liiquid per hour  Continue taking Myrbetriq  Discussed options for Botox, PTNS, and / or sacral neuromodulation     Constipation:  Continue  Smooth Move Tea     Follow up in 3 months    Diego Irby MD  "

## 2023-11-20 ENCOUNTER — ANTICOAGULATION - WARFARIN VISIT (OUTPATIENT)
Dept: PRIMARY CARE | Facility: CLINIC | Age: 73
End: 2023-11-20

## 2023-11-20 ENCOUNTER — CLINICAL SUPPORT (OUTPATIENT)
Dept: PRIMARY CARE | Facility: CLINIC | Age: 73
End: 2023-11-20
Payer: MEDICARE

## 2023-11-20 DIAGNOSIS — I48.0 PAROXYSMAL ATRIAL FIBRILLATION (MULTI): ICD-10-CM

## 2023-11-20 LAB
POC INR: 5
POC INR: 5 (ref 0.9–1.1)
POC INR: 5 (ref 2–3)
POC PROTHROMBIN TIME: NORMAL

## 2023-11-20 PROCEDURE — 85610 PROTHROMBIN TIME: CPT | Performed by: FAMILY MEDICINE

## 2023-11-27 ENCOUNTER — CLINICAL SUPPORT (OUTPATIENT)
Dept: PRIMARY CARE | Facility: CLINIC | Age: 73
End: 2023-11-27
Payer: MEDICARE

## 2023-11-27 DIAGNOSIS — I48.91 ATRIAL FIBRILLATION, UNSPECIFIED TYPE (MULTI): ICD-10-CM

## 2023-11-27 LAB
POC INR: 2.5
POC PROTHROMBIN TIME: NORMAL

## 2023-11-27 PROCEDURE — 85610 PROTHROMBIN TIME: CPT | Performed by: FAMILY MEDICINE

## 2023-12-04 ENCOUNTER — CLINICAL SUPPORT (OUTPATIENT)
Dept: PRIMARY CARE | Facility: CLINIC | Age: 73
End: 2023-12-04
Payer: MEDICARE

## 2023-12-04 DIAGNOSIS — I48.0 PAROXYSMAL ATRIAL FIBRILLATION (MULTI): Primary | ICD-10-CM

## 2023-12-04 LAB
POC INR: 2.4
POC PROTHROMBIN TIME: NORMAL

## 2023-12-04 PROCEDURE — 85610 PROTHROMBIN TIME: CPT | Performed by: FAMILY MEDICINE

## 2023-12-06 ENCOUNTER — APPOINTMENT (OUTPATIENT)
Dept: PRIMARY CARE | Facility: CLINIC | Age: 73
End: 2023-12-06
Payer: MEDICARE

## 2023-12-11 ENCOUNTER — APPOINTMENT (OUTPATIENT)
Dept: PRIMARY CARE | Facility: CLINIC | Age: 73
End: 2023-12-11
Payer: MEDICARE

## 2023-12-14 ENCOUNTER — APPOINTMENT (OUTPATIENT)
Dept: PRIMARY CARE | Facility: CLINIC | Age: 73
End: 2023-12-14
Payer: MEDICARE

## 2023-12-14 DIAGNOSIS — N32.81 OAB (OVERACTIVE BLADDER): Primary | ICD-10-CM

## 2023-12-14 RX ORDER — MIRABEGRON 50 MG/1
50 TABLET, FILM COATED, EXTENDED RELEASE ORAL NIGHTLY
Qty: 90 TABLET | Refills: 3 | Status: SHIPPED | OUTPATIENT
Start: 2023-12-14 | End: 2024-12-13

## 2023-12-15 ENCOUNTER — APPOINTMENT (OUTPATIENT)
Dept: PRIMARY CARE | Facility: CLINIC | Age: 73
End: 2023-12-15
Payer: MEDICARE

## 2023-12-18 ENCOUNTER — OFFICE VISIT (OUTPATIENT)
Dept: PRIMARY CARE | Facility: CLINIC | Age: 73
End: 2023-12-18
Payer: MEDICARE

## 2023-12-18 VITALS
OXYGEN SATURATION: 95 % | HEART RATE: 65 BPM | BODY MASS INDEX: 31.01 KG/M2 | HEIGHT: 63 IN | WEIGHT: 175 LBS | DIASTOLIC BLOOD PRESSURE: 77 MMHG | TEMPERATURE: 97.6 F | SYSTOLIC BLOOD PRESSURE: 122 MMHG

## 2023-12-18 DIAGNOSIS — Z12.11 SCREENING FOR COLON CANCER: ICD-10-CM

## 2023-12-18 DIAGNOSIS — I48.91 ATRIAL FIBRILLATION, UNSPECIFIED TYPE (MULTI): ICD-10-CM

## 2023-12-18 DIAGNOSIS — I48.0 PAROXYSMAL ATRIAL FIBRILLATION (MULTI): ICD-10-CM

## 2023-12-18 DIAGNOSIS — J30.9 ALLERGIC RHINITIS, UNSPECIFIED SEASONALITY, UNSPECIFIED TRIGGER: ICD-10-CM

## 2023-12-18 DIAGNOSIS — E78.5 HYPERLIPIDEMIA, UNSPECIFIED HYPERLIPIDEMIA TYPE: ICD-10-CM

## 2023-12-18 DIAGNOSIS — Z00.00 MEDICARE ANNUAL WELLNESS VISIT, SUBSEQUENT: Primary | ICD-10-CM

## 2023-12-18 DIAGNOSIS — K21.00 GASTROESOPHAGEAL REFLUX DISEASE WITH ESOPHAGITIS WITHOUT HEMORRHAGE: ICD-10-CM

## 2023-12-18 LAB
POC INR: 1.8 (ref 2–3)
POC PROTHROMBIN TIME: ABNORMAL

## 2023-12-18 PROCEDURE — 1036F TOBACCO NON-USER: CPT | Performed by: FAMILY MEDICINE

## 2023-12-18 PROCEDURE — 85610 PROTHROMBIN TIME: CPT | Performed by: FAMILY MEDICINE

## 2023-12-18 PROCEDURE — 3008F BODY MASS INDEX DOCD: CPT | Performed by: FAMILY MEDICINE

## 2023-12-18 PROCEDURE — G0439 PPPS, SUBSEQ VISIT: HCPCS | Performed by: FAMILY MEDICINE

## 2023-12-18 PROCEDURE — 99213 OFFICE O/P EST LOW 20 MIN: CPT | Performed by: FAMILY MEDICINE

## 2023-12-18 PROCEDURE — 1125F AMNT PAIN NOTED PAIN PRSNT: CPT | Performed by: FAMILY MEDICINE

## 2023-12-18 PROCEDURE — 1170F FXNL STATUS ASSESSED: CPT | Performed by: FAMILY MEDICINE

## 2023-12-18 PROCEDURE — 1159F MED LIST DOCD IN RCRD: CPT | Performed by: FAMILY MEDICINE

## 2023-12-18 RX ORDER — WARFARIN 1 MG/1
TABLET ORAL
Qty: 180 TABLET | Refills: 3
Start: 2023-12-18 | End: 2024-05-20 | Stop reason: SDUPTHER

## 2023-12-18 RX ORDER — LOVASTATIN 40 MG/1
40 TABLET ORAL NIGHTLY
Qty: 90 TABLET | Refills: 1 | Status: SHIPPED | OUTPATIENT
Start: 2023-12-18 | End: 2024-02-13 | Stop reason: SDUPTHER

## 2023-12-18 RX ORDER — PANTOPRAZOLE SODIUM 40 MG/1
40 TABLET, DELAYED RELEASE ORAL
Qty: 90 TABLET | Refills: 1 | Status: SHIPPED | OUTPATIENT
Start: 2023-12-18 | End: 2024-02-13 | Stop reason: SDUPTHER

## 2023-12-18 RX ORDER — FLUTICASONE PROPIONATE 50 MCG
2 SPRAY, SUSPENSION (ML) NASAL DAILY
Qty: 48 G | Refills: 3
Start: 2023-12-18

## 2023-12-18 RX ORDER — WARFARIN 6 MG/1
TABLET ORAL
Qty: 45 TABLET | Refills: 2
Start: 2023-12-18 | End: 2024-06-06 | Stop reason: SDUPTHER

## 2023-12-18 ASSESSMENT — ENCOUNTER SYMPTOMS
DIZZINESS: 0
UNEXPECTED WEIGHT CHANGE: 0
CHILLS: 0
BLOOD IN STOOL: 0
DIFFICULTY URINATING: 0
OCCASIONAL FEELINGS OF UNSTEADINESS: 0
CONFUSION: 0
WHEEZING: 0
ABDOMINAL PAIN: 0
WEAKNESS: 0
SHORTNESS OF BREATH: 0
DEPRESSION: 0
DIARRHEA: 0
TROUBLE SWALLOWING: 0
DYSURIA: 0
COUGH: 0
LOSS OF SENSATION IN FEET: 0
NUMBNESS: 0
NAUSEA: 0
VOMITING: 0
LIGHT-HEADEDNESS: 0
FEVER: 0

## 2023-12-18 ASSESSMENT — ACTIVITIES OF DAILY LIVING (ADL)
GROCERY_SHOPPING: INDEPENDENT
TAKING_MEDICATION: INDEPENDENT
DRESSING: INDEPENDENT
DOING_HOUSEWORK: INDEPENDENT
BATHING: INDEPENDENT
MANAGING_FINANCES: INDEPENDENT

## 2023-12-18 ASSESSMENT — PATIENT HEALTH QUESTIONNAIRE - PHQ9
2. FEELING DOWN, DEPRESSED OR HOPELESS: NOT AT ALL
1. LITTLE INTEREST OR PLEASURE IN DOING THINGS: NOT AT ALL
SUM OF ALL RESPONSES TO PHQ9 QUESTIONS 1 AND 2: 0

## 2023-12-18 NOTE — PROGRESS NOTES
"Subjective   Reason for Visit: Mariposa Comer is an 73 y.o. female here for a Medicare Wellness visit.     Past Medical, Surgical, and Family History reviewed and updated in chart.    Reviewed all medications by prescribing practitioner or clinical pharmacist (such as prescriptions, OTCs, herbal therapies and supplements) and documented in the medical record.    HPI    Generally feeling well.     Trying to wean off of the Gabapentin, trying to use more topicals. Doesn't want to get a refill, for now.    Reports she did get this season's flu shot.    Take Warfarin 4 mg every day.    Patient Care Team:  Minh Lopez DO as PCP - General (Family Medicine)  Ade Bland MD as PCP - [employer] PCP  Ciaran Garrido MD as Consulting Physician (Cardiology)  Whitney Mayorga MD as Consulting Physician (Cardiology)  Luis Eduardo Stafford MD as Referring Physician (Pulmonary Disease)  Jessica Tapia MD as Referring Physician (Gastroenterology)  Marlene Acosta DO as Obstetrician (Obstetrics and Gynecology)  Diego Irby MD as Surgeon (Obstetrics and Gynecology)     Review of Systems   Constitutional:  Negative for chills, fever and unexpected weight change.   HENT:  Negative for ear pain and trouble swallowing.    Respiratory:  Negative for cough, shortness of breath and wheezing.    Cardiovascular:  Negative for chest pain.   Gastrointestinal:  Negative for abdominal pain, blood in stool, diarrhea, nausea and vomiting.   Genitourinary:  Negative for difficulty urinating and dysuria.   Skin:  Negative for rash.   Neurological:  Negative for dizziness, syncope, weakness, light-headedness and numbness.   Psychiatric/Behavioral:  Negative for behavioral problems and confusion.        Objective   Vitals:  /77   Pulse 65   Temp 36.4 °C (97.6 °F)   Ht 1.6 m (5' 3\")   Wt 79.4 kg (175 lb)   SpO2 95%   BMI 31.00 kg/m²       Physical Exam  Vitals and nursing note reviewed.   Constitutional: "       General: She is not in acute distress.     Appearance: Normal appearance. She is not diaphoretic.   HENT:      Head: Normocephalic and atraumatic.   Eyes:      General: No scleral icterus.     Extraocular Movements: Extraocular movements intact.      Conjunctiva/sclera: Conjunctivae normal.   Cardiovascular:      Rate and Rhythm: Normal rate. Rhythm irregular.      Heart sounds: Normal heart sounds.   Pulmonary:      Effort: Pulmonary effort is normal. No respiratory distress.      Breath sounds: Normal breath sounds.   Abdominal:      General: Bowel sounds are normal. There is no distension.      Palpations: Abdomen is soft. There is no mass.      Tenderness: There is no abdominal tenderness. There is no guarding or rebound.   Musculoskeletal:      Right lower leg: No edema.      Left lower leg: No edema.   Skin:     General: Skin is warm and dry.      Coloration: Skin is not jaundiced.   Neurological:      General: No focal deficit present.      Mental Status: She is alert and oriented to person, place, and time. Mental status is at baseline.   Psychiatric:         Mood and Affect: Mood normal.         Behavior: Behavior normal.         Thought Content: Thought content normal.         Assessment/Plan   Problem List Items Addressed This Visit       Atrial fibrillation (CMS/HCC)    Relevant Medications    warfarin (Coumadin) 1 mg tablet    warfarin (Coumadin) 6 mg tablet    prothrombin time/INR test metr misc    Esophageal reflux    Relevant Medications    pantoprazole (ProtoNix) 40 mg EC tablet    Hyperlipidemia    Relevant Medications    lovastatin (Mevacor) 40 mg tablet    Paroxysmal atrial fibrillation (CMS/HCC)    Current Assessment & Plan     Increase back to previous usual dosing of 5 mg daily. Recheck 1w.         Relevant Medications    warfarin (Coumadin) 1 mg tablet    Screening for colon cancer    Current Assessment & Plan     Reports last colonoscopy with Dr. Tapia in 2021 and due for repeat at 5  or 10y, she can't recall which.         Medicare annual wellness visit, subsequent - Primary    Current Assessment & Plan     73yF doing fairly well.          Other Visit Diagnoses       Allergic rhinitis, unspecified seasonality, unspecified trigger        Relevant Medications    fluticasone (Flonase) 50 mcg/actuation nasal spray

## 2023-12-18 NOTE — PATIENT INSTRUCTIONS
Proton Pump Inhibitors (PPI, e.g., Protonix/pantoprazole, Prilosec/omeprazole, Nexium/esomeprazole, and others) may cause vitamin or mineral deficiencies, kidney damage, dementia, stomach polyps, fractures and thinning of the bones (osteoporosis), intestinal infections including C. diff., lupus. Some of these side effects are more likely with chronic use. There are other possible side effects, and it is recommended, as with any medication, to review all possible side effects.     Please return for a follow-up appointment in 6 months, earlier if any question or concern.    Shingrix (recombinant zoster, i.e., shingles, vaccine) is a 2-shot vaccination series given at 0 and 2-6 months, indicated for prevention of herpes zoster (shingles) in patients >=50 years of age. The Advisory Committee on Immunization Practices (ACIP) recommends that Shingrix is used for the routine vaccination of immunocompetent patients >=50 years of age, including those who previously received varicella vaccine or zoster vaccine (live) or who report a previous episode of zoster; and patients with chronic medical conditions (e.g., chronic renal failure, diabetes, rheumatoid arthritis, chronic pulmonary disease). Recombinant zoster vaccine is preferred over zoster vaccine (live) in immunocompetent patients (CDC/ACIP [[place] 2018]). Limitations of use: Not indicated for prevention of primary varicella infection (chickenpox) or for the treatment of zoster or postherpetic neuralgia (PHN) (CDC/ACIP [[place] 2018]).  In adults 50 to 69 years old with healthy immune systems, Shingrix was 97% effective in preventing shingles; in adults 70 years and older, Shingrix was 91% effective. In adults 50 years and older, Shingrix was 91% effective in preventing post-herpetic neuralgia; in adults 70 years and older, Shingrix was 89% effective.     Also recommend the RSV (respiratory syncytial virus) vaccine.    For assistance with scheduling referrals or  consultations, please call [phone]. For laboratory, radiology, and other tests, please call [phone] ([phone] for pediatrics). Please review prescription inserts and published information for possible adverse effects of all medications. Return after testing or consultation to review results and recommendations, if symptoms persist, change, worsen, or return, or if you have any question or concern. If you do not get results within 7-10 days, or you have any question or concern, please send a message, call the office ([phone]), or return to the office for a follow-up appointment. For non-emergencies, you may call the office. For emergencies, call [phone] or go to the nearest Emergency Department. Please schedule additional appointment(s) to address concern(s) not addressed today.    In general, results are not released or discussed over the telephone, but at an appointment or via  [employer]. Results of tests done through [facility] are released via  [employer] (see below).  [email or web address]   [employer] support line: [phone]

## 2023-12-18 NOTE — ASSESSMENT & PLAN NOTE
Reports last colonoscopy with Dr. Tapia in 2021 and due for repeat at 5 or 10y, she can't recall which.

## 2023-12-21 ENCOUNTER — LAB (OUTPATIENT)
Dept: LAB | Facility: LAB | Age: 73
End: 2023-12-21
Payer: MEDICARE

## 2023-12-21 DIAGNOSIS — N39.0 RECURRENT UTI: ICD-10-CM

## 2023-12-21 DIAGNOSIS — N39.0 RECURRENT UTI: Primary | ICD-10-CM

## 2023-12-21 PROCEDURE — 87086 URINE CULTURE/COLONY COUNT: CPT

## 2023-12-21 PROCEDURE — 87186 SC STD MICRODIL/AGAR DIL: CPT

## 2023-12-23 ENCOUNTER — TELEPHONE (OUTPATIENT)
Dept: UROLOGY | Facility: CLINIC | Age: 73
End: 2023-12-23
Payer: MEDICARE

## 2023-12-23 DIAGNOSIS — N32.81 OAB (OVERACTIVE BLADDER): Primary | ICD-10-CM

## 2023-12-23 LAB — BACTERIA UR CULT: ABNORMAL

## 2023-12-23 RX ORDER — SULFAMETHOXAZOLE AND TRIMETHOPRIM 800; 160 MG/1; MG/1
1 TABLET ORAL 2 TIMES DAILY
Qty: 10 TABLET | Refills: 0 | Status: SHIPPED | OUTPATIENT
Start: 2023-12-23 | End: 2023-12-28

## 2023-12-26 ENCOUNTER — TELEPHONE (OUTPATIENT)
Dept: UROLOGY | Facility: CLINIC | Age: 73
End: 2023-12-26
Payer: MEDICARE

## 2023-12-26 DIAGNOSIS — N32.81 OAB (OVERACTIVE BLADDER): Primary | ICD-10-CM

## 2023-12-26 RX ORDER — SULFAMETHOXAZOLE AND TRIMETHOPRIM 800; 160 MG/1; MG/1
1 TABLET ORAL 2 TIMES DAILY
Qty: 6 TABLET | Refills: 0 | Status: SHIPPED | OUTPATIENT
Start: 2023-12-26 | End: 2023-12-29

## 2023-12-27 ENCOUNTER — CLINICAL SUPPORT (OUTPATIENT)
Dept: PRIMARY CARE | Facility: CLINIC | Age: 73
End: 2023-12-27
Payer: MEDICARE

## 2023-12-27 DIAGNOSIS — I48.0 PAROXYSMAL ATRIAL FIBRILLATION (MULTI): ICD-10-CM

## 2023-12-27 LAB
POC INR: 3.5
POC PROTHROMBIN TIME: NORMAL

## 2023-12-27 PROCEDURE — 85610 PROTHROMBIN TIME: CPT | Performed by: FAMILY MEDICINE

## 2024-01-02 ENCOUNTER — CLINICAL SUPPORT (OUTPATIENT)
Dept: PRIMARY CARE | Facility: CLINIC | Age: 74
End: 2024-01-02
Payer: MEDICARE

## 2024-01-02 DIAGNOSIS — I48.0 PAROXYSMAL ATRIAL FIBRILLATION (MULTI): ICD-10-CM

## 2024-01-02 LAB
POC INR: 4 (ref 2–3)
POC PROTHROMBIN TIME: ABNORMAL

## 2024-01-09 ENCOUNTER — CLINICAL SUPPORT (OUTPATIENT)
Dept: PRIMARY CARE | Facility: CLINIC | Age: 74
End: 2024-01-09
Payer: MEDICARE

## 2024-01-09 DIAGNOSIS — I48.91 ATRIAL FIBRILLATION, UNSPECIFIED TYPE (MULTI): ICD-10-CM

## 2024-01-09 LAB
POC INR: 3.2
POC PROTHROMBIN TIME: NORMAL

## 2024-01-09 PROCEDURE — 85610 PROTHROMBIN TIME: CPT | Performed by: FAMILY MEDICINE

## 2024-01-16 ENCOUNTER — CLINICAL SUPPORT (OUTPATIENT)
Dept: PRIMARY CARE | Facility: CLINIC | Age: 74
End: 2024-01-16
Payer: MEDICARE

## 2024-01-16 DIAGNOSIS — I48.0 PAROXYSMAL ATRIAL FIBRILLATION (MULTI): ICD-10-CM

## 2024-01-16 LAB
POC INR: 1.6
POC PROTHROMBIN TIME: NORMAL

## 2024-01-23 ENCOUNTER — ANTICOAGULATION - WARFARIN VISIT (OUTPATIENT)
Dept: PRIMARY CARE | Facility: CLINIC | Age: 74
End: 2024-01-23

## 2024-01-23 ENCOUNTER — CLINICAL SUPPORT (OUTPATIENT)
Dept: PRIMARY CARE | Facility: CLINIC | Age: 74
End: 2024-01-23
Payer: MEDICARE

## 2024-01-23 DIAGNOSIS — I48.20 CHRONIC ATRIAL FIBRILLATION (MULTI): ICD-10-CM

## 2024-01-23 DIAGNOSIS — I48.0 PAROXYSMAL ATRIAL FIBRILLATION (MULTI): ICD-10-CM

## 2024-01-23 LAB
POC INR: 1.9
POC INR: 1.9
POC PROTHROMBIN TIME: NORMAL
POC PROTHROMBIN TIME: NORMAL

## 2024-01-30 ENCOUNTER — APPOINTMENT (OUTPATIENT)
Dept: PRIMARY CARE | Facility: CLINIC | Age: 74
End: 2024-01-30
Payer: MEDICARE

## 2024-01-30 ENCOUNTER — ANTICOAGULATION - WARFARIN VISIT (OUTPATIENT)
Dept: PRIMARY CARE | Facility: CLINIC | Age: 74
End: 2024-01-30

## 2024-01-30 DIAGNOSIS — I48.91 ATRIAL FIBRILLATION, UNSPECIFIED TYPE (MULTI): ICD-10-CM

## 2024-01-30 LAB
POC INR: 2.4 (ref 2–3)
POC PROTHROMBIN TIME: NORMAL

## 2024-01-31 ENCOUNTER — APPOINTMENT (OUTPATIENT)
Dept: PRIMARY CARE | Facility: CLINIC | Age: 74
End: 2024-01-31
Payer: MEDICARE

## 2024-02-13 ENCOUNTER — CLINICAL SUPPORT (OUTPATIENT)
Dept: PRIMARY CARE | Facility: CLINIC | Age: 74
End: 2024-02-13
Payer: MEDICARE

## 2024-02-13 ENCOUNTER — TELEPHONE (OUTPATIENT)
Dept: PRIMARY CARE | Facility: CLINIC | Age: 74
End: 2024-02-13

## 2024-02-13 DIAGNOSIS — I48.0 PAROXYSMAL ATRIAL FIBRILLATION (MULTI): ICD-10-CM

## 2024-02-13 DIAGNOSIS — E78.5 HYPERLIPIDEMIA, UNSPECIFIED HYPERLIPIDEMIA TYPE: ICD-10-CM

## 2024-02-13 DIAGNOSIS — K21.00 GASTROESOPHAGEAL REFLUX DISEASE WITH ESOPHAGITIS WITHOUT HEMORRHAGE: ICD-10-CM

## 2024-02-13 LAB
INR IN PPP BY COAGULATION ASSAY EXTERNAL: 1.8
POC INR: 2
POC PROTHROMBIN TIME: NORMAL
PROTHROMBIN TIME (PT) IN PPP BY COAGULATION ASSAY EXTERNAL: NORMAL SECONDS

## 2024-02-13 NOTE — TELEPHONE ENCOUNTER
Rx Refill Request Telephone Encounter    Name:  Mariposa Comer  :  [id number]  Medication Name:    LOVASTATIN 40MG Q/D   PANTOPRAZOLE 40MG Q/D 90 DAY   Specific Pharmacy location:  [facility]  Date of last appointment:  2024  Date of next appointment:  N/A  Best number to reach patient:  [phone]

## 2024-02-15 ENCOUNTER — OFFICE VISIT (OUTPATIENT)
Dept: UROLOGY | Facility: CLINIC | Age: 74
End: 2024-02-15
Payer: MEDICARE

## 2024-02-15 DIAGNOSIS — N95.8 GENITOURINARY SYNDROME OF MENOPAUSE: ICD-10-CM

## 2024-02-15 DIAGNOSIS — R15.9 INCONTINENCE OF FECES, UNSPECIFIED FECAL INCONTINENCE TYPE: ICD-10-CM

## 2024-02-15 DIAGNOSIS — N32.81 OAB (OVERACTIVE BLADDER): Primary | ICD-10-CM

## 2024-02-15 PROCEDURE — 1036F TOBACCO NON-USER: CPT | Performed by: STUDENT IN AN ORGANIZED HEALTH CARE EDUCATION/TRAINING PROGRAM

## 2024-02-15 PROCEDURE — 3008F BODY MASS INDEX DOCD: CPT | Performed by: STUDENT IN AN ORGANIZED HEALTH CARE EDUCATION/TRAINING PROGRAM

## 2024-02-15 PROCEDURE — 99214 OFFICE O/P EST MOD 30 MIN: CPT | Performed by: STUDENT IN AN ORGANIZED HEALTH CARE EDUCATION/TRAINING PROGRAM

## 2024-02-15 PROCEDURE — 1125F AMNT PAIN NOTED PAIN PRSNT: CPT | Performed by: STUDENT IN AN ORGANIZED HEALTH CARE EDUCATION/TRAINING PROGRAM

## 2024-02-15 RX ORDER — LOVASTATIN 40 MG/1
40 TABLET ORAL NIGHTLY
Qty: 90 TABLET | Refills: 1 | Status: SHIPPED | OUTPATIENT
Start: 2024-02-15

## 2024-02-15 RX ORDER — PANTOPRAZOLE SODIUM 40 MG/1
40 TABLET, DELAYED RELEASE ORAL
Qty: 90 TABLET | Refills: 1 | Status: SHIPPED | OUTPATIENT
Start: 2024-02-15

## 2024-02-15 NOTE — PROGRESS NOTES
CHIEF COMPLAINT: FUV         HISTORY OF PRESENT ILLNESS:  Mariposa Comer is a 74 y/o female presenting on 2/15/24 for a follow up. Patient is interested in doing botox as her insurance no longer covers Mybetriq.          Past Medical History  She has a past medical history of Acute upper respiratory infection (07/24/2023), Asymptomatic menopausal state, Encounter for full-term uncomplicated delivery, Malignant neoplasm of connective and soft tissue of left lower limb, including hip (CMS/HCC), Menopausal and female climacteric states, Nausea, Old myocardial infarction, Personal history of other benign neoplasm, Personal history of other diseases of the digestive system, Personal history of other diseases of the respiratory system, Personal history of pneumonia (recurrent), Sore throat (03/22/2023), and Unspecified ovarian cyst, unspecified side.    Surgical History  She has a past surgical history that includes Cholecystectomy (07/22/2014); Tonsillectomy (07/22/2014); Tubal ligation (07/22/2014); Colonoscopy (07/22/2014); Other surgical history (07/22/2014); Other surgical history (08/20/2021); Other surgical history (05/13/2020); Other surgical history (04/07/2020); Other surgical history (04/07/2020); Other surgical history (04/07/2020); and Other surgical history (01/06/2020).     Social History  She reports that she quit smoking about 53 years ago. Her smoking use included cigarettes. She has never used smokeless tobacco. She reports that she does not drink alcohol and does not use drugs.    Family History  Family History   Problem Relation Name Age of Onset    Hyperlipidemia Mother      Hypertension Mother      Other (Cardiac disorder) Mother      Heart attack Mother      Raynaud syndrome Mother      Sjogren's syndrome Mother      Heart attack Father      Other (Other) Father          Allergies  Omeprazole and Tramadol      A comprehensive 10+ review of systems was negative except for: see hpi                    "       PHYSICAL EXAMINATION:  BP Readings from Last 3 Encounters:   12/18/23 122/77   07/25/23 124/72   05/03/23 122/67      Wt Readings from Last 3 Encounters:   12/18/23 79.4 kg (175 lb)   10/20/23 81.6 kg (180 lb)   07/25/23 82.6 kg (182 lb 3.2 oz)      BMI:   Estimated body mass index is 31 kg/m² as calculated from the following:    Height as of 12/18/23: 1.6 m (5' 3\").    Weight as of 12/18/23: 79.4 kg (175 lb).  BSA:   Estimated body surface area is 1.88 meters squared as calculated from the following:    Height as of 12/18/23: 1.6 m (5' 3\").    Weight as of 12/18/23: 79.4 kg (175 lb).  HEENT: Normocephalic, atraumatic, PER EOMI, nonicteric, trachea normal, thyroid normal, oropharynx normal.  CARDIAC: regular rate & rhythm, S1 & S2 normal.  No heaves, thrills, gallops or murmurs.  LUNGS: Clear to auscultation, no spinal or CV tenderness.  EXTREMITIES: No evidence of cyanosis, clubbing or edema.           Provider Impressions:  73-year-old with OAB, urge incontinence, and fecal incontinence with constipation and genitourinary syndrome of menopause     GUSM:  Continue using estradiol cream     FI:   Recommend taking fiber supplement Metamucil  Discussed options for sacral neuromodulation     OAB/UUI  Drink no more than 8 oz. of liiquid per hour  Myrbetriq not covered by insurance, wants to proceed with botox  Plan on UDS       Constipation:  Continue  Smooth Move Tea      Diego Irby MD    By signing my name below, I, Hamzah Flores   attest that this documentation has been prepared under the direction and in the presence of Dr. Diego Irby.     "

## 2024-02-20 ENCOUNTER — ANTICOAGULATION - WARFARIN VISIT (OUTPATIENT)
Dept: PRIMARY CARE | Facility: CLINIC | Age: 74
End: 2024-02-20

## 2024-02-20 ENCOUNTER — APPOINTMENT (OUTPATIENT)
Dept: PRIMARY CARE | Facility: CLINIC | Age: 74
End: 2024-02-20
Payer: MEDICARE

## 2024-02-26 ENCOUNTER — PROCEDURE VISIT (OUTPATIENT)
Dept: UROLOGY | Facility: CLINIC | Age: 74
End: 2024-02-26
Payer: MEDICARE

## 2024-02-26 DIAGNOSIS — N32.81 OAB (OVERACTIVE BLADDER): Primary | ICD-10-CM

## 2024-02-26 LAB
POC APPEARANCE, URINE: CLEAR
POC BILIRUBIN, URINE: NEGATIVE
POC BLOOD, URINE: ABNORMAL
POC COLOR, URINE: YELLOW
POC GLUCOSE, URINE: NEGATIVE MG/DL
POC KETONES, URINE: NEGATIVE MG/DL
POC LEUKOCYTES, URINE: ABNORMAL
POC NITRITE,URINE: NEGATIVE
POC PH, URINE: 6 PH
POC PROTEIN, URINE: NEGATIVE MG/DL
POC SPECIFIC GRAVITY, URINE: 1.01
POC UROBILINOGEN, URINE: 0.2 EU/DL

## 2024-02-26 PROCEDURE — 51741 ELECTRO-UROFLOWMETRY FIRST: CPT | Performed by: STUDENT IN AN ORGANIZED HEALTH CARE EDUCATION/TRAINING PROGRAM

## 2024-02-26 PROCEDURE — 51784 ANAL/URINARY MUSCLE STUDY: CPT | Performed by: STUDENT IN AN ORGANIZED HEALTH CARE EDUCATION/TRAINING PROGRAM

## 2024-02-26 PROCEDURE — 51797 INTRAABDOMINAL PRESSURE TEST: CPT | Performed by: STUDENT IN AN ORGANIZED HEALTH CARE EDUCATION/TRAINING PROGRAM

## 2024-02-26 PROCEDURE — 51729 CYSTOMETROGRAM W/VP&UP: CPT | Performed by: STUDENT IN AN ORGANIZED HEALTH CARE EDUCATION/TRAINING PROGRAM

## 2024-02-26 PROCEDURE — 81003 URINALYSIS AUTO W/O SCOPE: CPT | Performed by: STUDENT IN AN ORGANIZED HEALTH CARE EDUCATION/TRAINING PROGRAM

## 2024-02-26 NOTE — PROGRESS NOTES
Mariposa Comer 7/2 yo female     Patient was referred by Dr. Irby to evaluate overactice bladder.  Dr. Hernández  was present in the office at time of study.  Pre uroflow was completed today with a PVR of 225ml.  Urine was clear for UTI.  Patient did leak on CLPP/VLPP.  Patient did not have DO with leak. Patient did have some discomfort but tolerated the test well with no concerns.  PVR after study was 13ml.    Patient instructed to increase fluids if blood in the urine or burning due to cath insertion.  Patient understood and consented to Urodynamics.  Patient will follow up with Dr. Irby to review results.  2/26/24 CM

## 2024-02-27 LAB
INR IN PPP BY COAGULATION ASSAY EXTERNAL: 2
PROTHROMBIN TIME (PT) IN PPP BY COAGULATION ASSAY EXTERNAL: NORMAL SECONDS

## 2024-02-29 ENCOUNTER — TELEMEDICINE (OUTPATIENT)
Dept: UROLOGY | Facility: CLINIC | Age: 74
End: 2024-02-29
Payer: MEDICARE

## 2024-02-29 DIAGNOSIS — N95.8 GENITOURINARY SYNDROME OF MENOPAUSE: ICD-10-CM

## 2024-02-29 DIAGNOSIS — R15.9 INCONTINENCE OF FECES, UNSPECIFIED FECAL INCONTINENCE TYPE: ICD-10-CM

## 2024-02-29 DIAGNOSIS — N32.81 OAB (OVERACTIVE BLADDER): Primary | ICD-10-CM

## 2024-02-29 PROCEDURE — 99214 OFFICE O/P EST MOD 30 MIN: CPT | Performed by: STUDENT IN AN ORGANIZED HEALTH CARE EDUCATION/TRAINING PROGRAM

## 2024-02-29 PROCEDURE — 1159F MED LIST DOCD IN RCRD: CPT | Performed by: STUDENT IN AN ORGANIZED HEALTH CARE EDUCATION/TRAINING PROGRAM

## 2024-02-29 PROCEDURE — 1036F TOBACCO NON-USER: CPT | Performed by: STUDENT IN AN ORGANIZED HEALTH CARE EDUCATION/TRAINING PROGRAM

## 2024-02-29 PROCEDURE — 1125F AMNT PAIN NOTED PAIN PRSNT: CPT | Performed by: STUDENT IN AN ORGANIZED HEALTH CARE EDUCATION/TRAINING PROGRAM

## 2024-02-29 PROCEDURE — 3008F BODY MASS INDEX DOCD: CPT | Performed by: STUDENT IN AN ORGANIZED HEALTH CARE EDUCATION/TRAINING PROGRAM

## 2024-02-29 NOTE — PROGRESS NOTES
HISTORY OF PRESENT ILLNESS:  Mariposa is a 73 year old female who presents today for a virtual follow up visit. She reports that she would like to continue with botox.          Past Medical History  She has a past medical history of Acute upper respiratory infection (07/24/2023), Asymptomatic menopausal state, Encounter for full-term uncomplicated delivery, Malignant neoplasm of connective and soft tissue of left lower limb, including hip (CMS/HCC), Menopausal and female climacteric states, Nausea, Old myocardial infarction, Personal history of other benign neoplasm, Personal history of other diseases of the digestive system, Personal history of other diseases of the respiratory system, Personal history of pneumonia (recurrent), Sore throat (03/22/2023), and Unspecified ovarian cyst, unspecified side.    Surgical History  She has a past surgical history that includes Cholecystectomy (07/22/2014); Tonsillectomy (07/22/2014); Tubal ligation (07/22/2014); Colonoscopy (07/22/2014); Other surgical history (07/22/2014); Other surgical history (08/20/2021); Other surgical history (05/13/2020); Other surgical history (04/07/2020); Other surgical history (04/07/2020); Other surgical history (04/07/2020); and Other surgical history (01/06/2020).     Social History  She reports that she quit smoking about 53 years ago. Her smoking use included cigarettes. She has never used smokeless tobacco. She reports that she does not drink alcohol and does not use drugs.    Family History  Family History   Problem Relation Name Age of Onset    Hyperlipidemia Mother      Hypertension Mother      Other (Cardiac disorder) Mother      Heart attack Mother      Raynaud syndrome Mother      Sjogren's syndrome Mother      Heart attack Father      Other (Other) Father          Allergies  Omeprazole and Tramadol      A comprehensive 10+ review of systems was negative except for: see hpi                          PHYSICAL EXAMINATION:  BP  "Readings from Last 3 Encounters:   12/18/23 122/77   07/25/23 124/72   05/03/23 122/67      Wt Readings from Last 3 Encounters:   12/18/23 79.4 kg (175 lb)   10/20/23 81.6 kg (180 lb)   07/25/23 82.6 kg (182 lb 3.2 oz)      BMI: Estimated body mass index is 31 kg/m² as calculated from the following:    Height as of 12/18/23: 1.6 m (5' 3\").    Weight as of 12/18/23: 79.4 kg (175 lb).  BSA: Estimated body surface area is 1.88 meters squared as calculated from the following:    Height as of 12/18/23: 1.6 m (5' 3\").    Weight as of 12/18/23: 79.4 kg (175 lb).  HEENT: Normocephalic, atraumatic, PER EOMI, nonicteric, trachea normal, thyroid normal, oropharynx normal.  CARDIAC: regular rate & rhythm, S1 & S2 normal.  No heaves, thrills, gallops or murmurs.  LUNGS: Clear to auscultation, no spinal or CV tenderness.  EXTREMITIES: No evidence of cyanosis, clubbing or edema.               Assessment:  73-year-old with OAB, urge incontinence, and fecal incontinence with constipation and genitourinary syndrome of menopause     GUSM:  Continue using estradiol cream     FI:   Recommend taking fiber supplement Metamucil  Discussed options for sacral neuromodulation     OAB/UUI  Drink no more than 8 oz. of liiquid per hour  Myrbetriq not covered by insurance, wants to proceed with botox  UDS shows normal emptying and TRU, but not bothered by TRU, wants to proceed with botox          Constipation:  Continue  Smooth Move Tea      Botox scheduled for 3/28/24    Follow up with botox injection on 3/28/24    Diego Irby MD  Scribe Attestation  By signing my name below, I Aniadebbie Myers, Scribe   attest that this documentation has been prepared under the direction and in the presence of Diego Irby MD.  "

## 2024-03-05 ENCOUNTER — TELEPHONE (OUTPATIENT)
Dept: PRIMARY CARE | Facility: CLINIC | Age: 74
End: 2024-03-05
Payer: MEDICARE

## 2024-03-12 ENCOUNTER — ANTICOAGULATION - WARFARIN VISIT (OUTPATIENT)
Dept: PRIMARY CARE | Facility: CLINIC | Age: 74
End: 2024-03-12
Payer: MEDICARE

## 2024-03-12 LAB
INR IN PPP BY COAGULATION ASSAY EXTERNAL: 2.5
PROTHROMBIN TIME (PT) IN PPP BY COAGULATION ASSAY EXTERNAL: NORMAL SECONDS

## 2024-03-20 ENCOUNTER — ANTICOAGULATION - WARFARIN VISIT (OUTPATIENT)
Dept: PRIMARY CARE | Facility: CLINIC | Age: 74
End: 2024-03-20
Payer: MEDICARE

## 2024-03-20 LAB
INR IN PPP BY COAGULATION ASSAY EXTERNAL: 2.3
INR IN PPP BY COAGULATION ASSAY EXTERNAL: 2.3
PROTHROMBIN TIME (PT) IN PPP BY COAGULATION ASSAY EXTERNAL: NORMAL SECONDS
PROTHROMBIN TIME (PT) IN PPP BY COAGULATION ASSAY EXTERNAL: NORMAL SECONDS

## 2024-03-21 ENCOUNTER — APPOINTMENT (OUTPATIENT)
Dept: CARDIOLOGY | Facility: HOSPITAL | Age: 74
End: 2024-03-21
Payer: MEDICARE

## 2024-03-26 ENCOUNTER — ANTICOAGULATION - WARFARIN VISIT (OUTPATIENT)
Dept: PRIMARY CARE | Facility: CLINIC | Age: 74
End: 2024-03-26
Payer: MEDICARE

## 2024-03-28 ENCOUNTER — PROCEDURE VISIT (OUTPATIENT)
Dept: UROLOGY | Facility: CLINIC | Age: 74
End: 2024-03-28
Payer: MEDICARE

## 2024-03-28 DIAGNOSIS — N32.81 OAB (OVERACTIVE BLADDER): Primary | ICD-10-CM

## 2024-03-28 LAB
POC APPEARANCE, URINE: CLEAR
POC BILIRUBIN, URINE: NEGATIVE
POC BLOOD, URINE: ABNORMAL
POC COLOR, URINE: YELLOW
POC GLUCOSE, URINE: NEGATIVE MG/DL
POC KETONES, URINE: NEGATIVE MG/DL
POC LEUKOCYTES, URINE: ABNORMAL
POC NITRITE,URINE: NEGATIVE
POC PH, URINE: 6 PH
POC PROTEIN, URINE: NEGATIVE MG/DL
POC SPECIFIC GRAVITY, URINE: 1.02
POC UROBILINOGEN, URINE: 0.2 EU/DL

## 2024-03-28 PROCEDURE — 52287 CYSTOSCOPY CHEMODENERVATION: CPT | Performed by: STUDENT IN AN ORGANIZED HEALTH CARE EDUCATION/TRAINING PROGRAM

## 2024-03-28 RX ORDER — LIDOCAINE HYDROCHLORIDE 10 MG/ML
50 INJECTION INFILTRATION; PERINEURAL ONCE
Status: COMPLETED | OUTPATIENT
Start: 2024-03-28 | End: 2024-03-28

## 2024-03-28 RX ORDER — NITROFURANTOIN 25; 75 MG/1; MG/1
100 CAPSULE ORAL 2 TIMES DAILY
Qty: 6 CAPSULE | Refills: 0 | Status: SHIPPED | OUTPATIENT
Start: 2024-03-28 | End: 2024-03-31

## 2024-03-28 RX ORDER — SODIUM BICARBONATE 1 MEQ/ML
10 SYRINGE (ML) INTRAVENOUS ONCE
Status: COMPLETED | OUTPATIENT
Start: 2024-03-28 | End: 2024-03-28

## 2024-03-28 RX ADMIN — LIDOCAINE HYDROCHLORIDE 500 MG: 10 INJECTION INFILTRATION; PERINEURAL at 12:55

## 2024-03-28 RX ADMIN — Medication 10 MEQ: at 12:57

## 2024-03-28 NOTE — PROGRESS NOTES
HISTORY OF PRESENT ILLNESS:  Mariposa Comer is a 73 y.o. female who presents today for a botox injection.          Past Medical History  She has a past medical history of Acute upper respiratory infection (07/24/2023), Asymptomatic menopausal state, Encounter for full-term uncomplicated delivery, Malignant neoplasm of connective and soft tissue of left lower limb, including hip (CMS/HCC), Menopausal and female climacteric states, Nausea, Old myocardial infarction, Personal history of other benign neoplasm, Personal history of other diseases of the digestive system, Personal history of other diseases of the respiratory system, Personal history of pneumonia (recurrent), Sore throat (03/22/2023), and Unspecified ovarian cyst, unspecified side.    Surgical History  She has a past surgical history that includes Cholecystectomy (07/22/2014); Tonsillectomy (07/22/2014); Tubal ligation (07/22/2014); Colonoscopy (07/22/2014); Other surgical history (07/22/2014); Other surgical history (08/20/2021); Other surgical history (05/13/2020); Other surgical history (04/07/2020); Other surgical history (04/07/2020); Other surgical history (04/07/2020); and Other surgical history (01/06/2020).     Social History  She reports that she quit smoking about 53 years ago. Her smoking use included cigarettes. She has never used smokeless tobacco. She reports that she does not drink alcohol and does not use drugs.    Family History  Family History   Problem Relation Name Age of Onset    Hyperlipidemia Mother      Hypertension Mother      Other (Cardiac disorder) Mother      Heart attack Mother      Raynaud syndrome Mother      Sjogren's syndrome Mother      Heart attack Father      Other (Other) Father          Allergies  Omeprazole and Tramadol      A comprehensive 10+ review of systems was negative except for: see hpi                          PHYSICAL EXAMINATION:  BP Readings from Last 3 Encounters:   12/18/23 122/77   07/25/23 124/72  "  05/03/23 122/67      Wt Readings from Last 3 Encounters:   12/18/23 79.4 kg (175 lb)   10/20/23 81.6 kg (180 lb)   07/25/23 82.6 kg (182 lb 3.2 oz)      BMI: Estimated body mass index is 31 kg/m² as calculated from the following:    Height as of 12/18/23: 1.6 m (5' 3\").    Weight as of 12/18/23: 79.4 kg (175 lb).  BSA: Estimated body surface area is 1.88 meters squared as calculated from the following:    Height as of 12/18/23: 1.6 m (5' 3\").    Weight as of 12/18/23: 79.4 kg (175 lb).  HEENT: Normocephalic, atraumatic, PER EOMI, nonicteric, trachea normal, thyroid normal, oropharynx normal.  CARDIAC: regular rate & rhythm, S1 & S2 normal.  No heaves, thrills, gallops or murmurs.  LUNGS: Clear to auscultation, no spinal or CV tenderness.  EXTREMITIES: No evidence of cyanosis, clubbing or edema.       Botox Injection    Mariposa came today for Botox injection.  I reviewed with her the risks and benefits including ptosis, infection, and bleeding. She has no contraindications. She is on no antibiotics and has no neuromuscular disorders.  Pregnancy is not an issue.     She tolerated the procedure well.  The patient  will return to see me again in three to four months, earlier if there are any problems.     Mariposa understands the side effects and risks, as well as the necessity for continued treatment to maintain improvement.  Additional therapy may be necessary. Call if there are any problems. See diagram for injection sites and dosages. 100 units were used at a concentration of 10 units per 0.1 mL.         Assessment:  73-year-old with OAB, urge incontinence, and fecal incontinence with constipation and genitourinary syndrome of menopause     GUSM:  Continue using estradiol cream     FI:   Recommend taking fiber supplement Metamucil  Discussed options for sacral neuromodulation     OAB/UUI  Drink no more than 8 oz. of liiquid per hour  Myrbetriq not covered by insurance, wants to proceed with botox  Plan on UDS   S/P " botox, 100 units 3/28/24  Rx Macrobid  Follow up in 4 to 6 weeks     Constipation:  Continue  Smooth Move Tea      Diego Irby MD    Scribe Attestation  By signing my name below, I, Ania Myers, Scribtati   attest that this documentation has been prepared under the direction and in the presence of Diego Irby MD.

## 2024-04-01 NOTE — PROGRESS NOTES
Electrophysiology Follow up Visit    Mariposa Comer is a 73 y.o. year old female patient with    Atrial fibrillation s/p AF RFA August 2020. She had a very high scar burden noted during RFA. PVI+Roof+anterior lines performed. Started on amiodarone for blanking period which was discontinued February 2021.     We saw patient last year and ECG was sinus rhythm with 1st degree AV block. Denies palpitations. No changes and recommended to follow up in 1 year.    Patient here for follow up for atrial fibrillation. She reports she is feeling well with good amount of energy. She does feel brief palpitations that last a few seconds. She is feeling good with her walking/hiking.  She continues to take warfarin with no bleeding concerns.     Medical History  She has a past medical history of Acute upper respiratory infection (07/24/2023), Asymptomatic menopausal state, Encounter for full-term uncomplicated delivery, Malignant neoplasm of connective and soft tissue of left lower limb, including hip (CMS/HCC), Menopausal and female climacteric states, Nausea, Old myocardial infarction, Personal history of other benign neoplasm, Personal history of other diseases of the digestive system, Personal history of other diseases of the respiratory system, Personal history of pneumonia (recurrent), Sore throat (03/22/2023), and Unspecified ovarian cyst, unspecified side.    Social History  She reports that she quit smoking about 53 years ago. Her smoking use included cigarettes. She has never used smokeless tobacco. She reports that she does not drink alcohol and does not use drugs.      FamHx:   Family History   Problem Relation Name Age of Onset   • Hyperlipidemia Mother     • Hypertension Mother     • Other (Cardiac disorder) Mother     • Heart attack Mother     • Raynaud syndrome Mother     • Sjogren's syndrome Mother     • Heart attack Father     • Other (Other) Father         Allergies   Allergen Reactions   • Omeprazole Headache   •  "Tramadol Unknown        Outpatient Medications:  Current Outpatient Medications   Medication Instructions   • albuterol 90 mcg/actuation inhaler 2 puffs, inhalation, Every 4 hours PRN, USE 2 INHALATIONS ONE MINUTE APART EVERY 4 OR MORE HOURS AS NEEDED FOR WHEEZE   • calcium carbonate-vitamin D3 1,000 mg-20 mcg (800 unit) tablet 1 Capful, oral   • coenzyme Q10 400 mg, oral, Daily with breakfast   • escitalopram (LEXAPRO) 10 mg, oral, Nightly   • estradiol (Estrace) 0.01 % (0.1 mg/gram) vaginal cream 1 Application, vaginal, Nightly, INSERT pea size amount INTRAVAGINALLY AT BEDTIME NIGHTLY x 14 days; then 2 times per week   • fluticasone (Flonase) 50 mcg/actuation nasal spray 2 sprays, Each Nostril, Daily   • gabapentin (Neurontin) 100 mg capsule TAKE 1 CAPSULE in the morning, 1 capsule at noon and 2 capsules at bedtime   • levocetirizine (XYZAL) 5 mg, oral   • lovastatin (MEVACOR) 40 mg, oral, Nightly   • mv,calcium,min/iron/folic/vitK (MULTI FOR HER ORAL) oral   • Myrbetriq 50 mg, oral, Nightly   • pantoprazole (PROTONIX) 40 mg, oral, Daily before breakfast, TAKE 1 TABLET Daily 30 minutes before breakfast   • prothrombin time/INR test metr misc Use to test your INR 1-2x weekly, or as instructed.   • psyllium (Metamucil, with sugar,) 3.4 gram packet 1 packet, oral, Daily, use 1 packet daily, if still having loose stool after 1 week, go up to 2 packets daily   • warfarin (Coumadin) 1 mg tablet Take 2 tablets daily. Total daily dose 5 mg.   • warfarin (Coumadin) 6 mg tablet Take half tablet once daily. Total daily dose 5 mg.         Last Recorded Vitals: .vital      9/1/2022    10:17 AM 3/9/2023     2:02 PM 5/3/2023    10:22 AM 7/25/2023     9:31 AM 10/20/2023     1:00 PM 12/18/2023    12:07 PM 4/2/2024    10:33 AM   Vitals   Systolic 103 132 122 124  122 132   Diastolic 62 76 67 72  77 80   Heart Rate  70 69 67 63 65 63   Temp     37.8 °C (100 °F) 36.4 °C (97.6 °F)    Height (in) 1.6 m (5' 3\") 1.6 m (5' 3\") 1.6 m (5' " "3\") 1.6 m (5' 3\")  1.6 m (5' 3\")    Weight (lb) 189 188.93 186.07 182.2 180 175 182.76   BMI 33.48 kg/m2 33.47 kg/m2 32.96 kg/m2 32.28 kg/m2 31.89 kg/m2 31 kg/m2 32.37 kg/m2   BSA (m2) 1.95 m2 1.95 m2 1.94 m2 1.92 m2 1.9 m2 1.88 m2 1.92 m2   Visit Report    Report  Report Report    Visit Vitals  /80   Pulse 63   Wt 82.9 kg (182 lb 12.2 oz)   SpO2 96%   BMI 32.37 kg/m²   OB Status Postmenopausal   Smoking Status Former   BSA 1.92 m²        Physical Exam  Constitutional:       Appearance: Normal appearance.   Cardiovascular:      Rate and Rhythm: Normal rate and regular rhythm.      Pulses: Normal pulses.      Heart sounds: Normal heart sounds.   Pulmonary:      Effort: Pulmonary effort is normal.      Breath sounds: Normal breath sounds.   Musculoskeletal:         General: Normal range of motion.      Right lower leg: No edema.      Left lower leg: No edema.   Skin:     General: Skin is warm and dry.   Neurological:      Mental Status: She is alert and oriented to person, place, and time.   Psychiatric:         Mood and Affect: Mood normal.      Cardiac Testing Reviewed Study(s):  Echo(June/2023):   CONCLUSIONS:  1. Left ventricular systolic function is low normal with a 50-55% estimated ejection fraction.  2. Spectral Doppler shows a pseudonormal pattern of left ventricular diastolic filling.  3. RVSP within normal limits.  ECGs (reviewed and my interpretation):   4/2/2024 sinus rhythm with PAC    Lab Results   Component Value Date    WBC 5.0 07/25/2023    HGB 14.0 07/25/2023    HCT 45.1 07/25/2023     07/25/2023    ALT 15 07/25/2023    AST 20 07/25/2023     07/25/2023    K 4.3 07/25/2023     07/25/2023    CREATININE 0.73 07/25/2023    BUN 24 (H) 07/25/2023    CO2 29 07/25/2023    TSH 0.93 11/04/2021    INR 2.50 03/26/2024       Assessment  Paroxysmal atrial fibrillation: ECG today sinus rhythm with PAC. No known recurrence of AF. We discussed lifestyle modifications to reduce risk of " recurrence including well controlled BP, limited alcohol use, and keeping active. Recommend continuing anticoagulation indefinitely due to elevated CHADVasc score. Patient did report an episode of feeling unwell and her pulse ox showed heart rate in the 40's. Episode lasted about 1 minute with no recurrence. Patient will continue to check heart rates occasionally and if she notices lower heart rates or has further symptoms, she will notify our office we will order a monitor. We will follow up in 1 year.      PLAN  Continue current medications  Follow up in 1 year      Exclusive of any other services or procedures performed, Laurel STEPHEN, DIANA-CNP , spent 40 minutes in duration for this visit today.  This time consisted of chart review, obtaining history, and/or performing the exam as documented above as well as documenting the clinical information for the encounter in the electronic record, discussing treatment options, plans, and/or goals with patient, family, and/or caregiver, refilling medications, updating the electronic record, ordering medicines, lab work, imaging, referrals, and/or procedures as documented above and communicating with other [facility] professionals. I have discussed the results of laboratory, radiology, and cardiology studies with the patient and their family/caregiver.

## 2024-04-02 ENCOUNTER — ANTICOAGULATION - WARFARIN VISIT (OUTPATIENT)
Dept: PRIMARY CARE | Facility: CLINIC | Age: 74
End: 2024-04-02
Payer: MEDICARE

## 2024-04-02 ENCOUNTER — OFFICE VISIT (OUTPATIENT)
Dept: CARDIOLOGY | Facility: HOSPITAL | Age: 74
End: 2024-04-02
Payer: MEDICARE

## 2024-04-02 VITALS
HEART RATE: 63 BPM | DIASTOLIC BLOOD PRESSURE: 80 MMHG | BODY MASS INDEX: 32.37 KG/M2 | SYSTOLIC BLOOD PRESSURE: 132 MMHG | OXYGEN SATURATION: 96 % | WEIGHT: 182.76 LBS

## 2024-04-02 DIAGNOSIS — I48.91 ATRIAL FIBRILLATION, UNSPECIFIED TYPE (MULTI): Primary | ICD-10-CM

## 2024-04-02 LAB
INR IN PPP BY COAGULATION ASSAY EXTERNAL: 2.7
PROTHROMBIN TIME (PT) IN PPP BY COAGULATION ASSAY EXTERNAL: NORMAL SECONDS

## 2024-04-02 PROCEDURE — 3008F BODY MASS INDEX DOCD: CPT | Performed by: NURSE PRACTITIONER

## 2024-04-02 PROCEDURE — 93005 ELECTROCARDIOGRAM TRACING: CPT | Performed by: NURSE PRACTITIONER

## 2024-04-02 PROCEDURE — 99214 OFFICE O/P EST MOD 30 MIN: CPT | Performed by: NURSE PRACTITIONER

## 2024-04-02 PROCEDURE — 1036F TOBACCO NON-USER: CPT | Performed by: NURSE PRACTITIONER

## 2024-04-02 PROCEDURE — 1159F MED LIST DOCD IN RCRD: CPT | Performed by: NURSE PRACTITIONER

## 2024-04-02 PROCEDURE — 93010 ELECTROCARDIOGRAM REPORT: CPT | Performed by: STUDENT IN AN ORGANIZED HEALTH CARE EDUCATION/TRAINING PROGRAM

## 2024-04-03 ENCOUNTER — TELEPHONE (OUTPATIENT)
Dept: PRIMARY CARE | Facility: CLINIC | Age: 74
End: 2024-04-03
Payer: MEDICARE

## 2024-04-03 NOTE — TELEPHONE ENCOUNTER
Pt given instructions for pt/inr, Mariposa states she will be out of country next [place] so will be unable to check.BL

## 2024-04-04 LAB
ATRIAL RATE: 61 BPM
P AXIS: 97 DEGREES
P OFFSET: 170 MS
P ONSET: 131 MS
PR INTERVAL: 188 MS
Q ONSET: 225 MS
QRS COUNT: 10 BEATS
QRS DURATION: 88 MS
QT INTERVAL: 414 MS
QTC CALCULATION(BAZETT): 416 MS
QTC FREDERICIA: 416 MS
R AXIS: -22 DEGREES
T AXIS: 111 DEGREES
T OFFSET: 432 MS
VENTRICULAR RATE: 61 BPM

## 2024-04-16 ENCOUNTER — ANTICOAGULATION - WARFARIN VISIT (OUTPATIENT)
Dept: PRIMARY CARE | Facility: CLINIC | Age: 74
End: 2024-04-16
Payer: MEDICARE

## 2024-04-16 DIAGNOSIS — R30.0 DYSURIA: Primary | ICD-10-CM

## 2024-04-17 ENCOUNTER — LAB (OUTPATIENT)
Dept: LAB | Facility: LAB | Age: 74
End: 2024-04-17
Payer: MEDICARE

## 2024-04-17 DIAGNOSIS — R30.0 DYSURIA: ICD-10-CM

## 2024-04-17 PROCEDURE — 87086 URINE CULTURE/COLONY COUNT: CPT

## 2024-04-17 PROCEDURE — 87186 SC STD MICRODIL/AGAR DIL: CPT

## 2024-04-19 DIAGNOSIS — N39.0 ACUTE UTI: Primary | ICD-10-CM

## 2024-04-19 LAB — BACTERIA UR CULT: ABNORMAL

## 2024-04-19 RX ORDER — NITROFURANTOIN 25; 75 MG/1; MG/1
100 CAPSULE ORAL 2 TIMES DAILY
Qty: 14 CAPSULE | Refills: 0 | Status: SHIPPED | OUTPATIENT
Start: 2024-04-19 | End: 2024-04-26

## 2024-04-23 ENCOUNTER — ANTICOAGULATION - WARFARIN VISIT (OUTPATIENT)
Dept: PRIMARY CARE | Facility: CLINIC | Age: 74
End: 2024-04-23

## 2024-04-30 ENCOUNTER — ANTICOAGULATION - WARFARIN VISIT (OUTPATIENT)
Dept: PRIMARY CARE | Facility: CLINIC | Age: 74
End: 2024-04-30
Payer: MEDICARE

## 2024-05-02 ENCOUNTER — LAB (OUTPATIENT)
Dept: LAB | Facility: LAB | Age: 74
End: 2024-05-02
Payer: MEDICARE

## 2024-05-02 ENCOUNTER — OFFICE VISIT (OUTPATIENT)
Dept: CARDIOLOGY | Facility: HOSPITAL | Age: 74
End: 2024-05-02
Payer: MEDICARE

## 2024-05-02 VITALS
OXYGEN SATURATION: 98 % | SYSTOLIC BLOOD PRESSURE: 122 MMHG | HEART RATE: 66 BPM | BODY MASS INDEX: 31.4 KG/M2 | WEIGHT: 177.25 LBS | DIASTOLIC BLOOD PRESSURE: 69 MMHG

## 2024-05-02 DIAGNOSIS — R30.0 DYSURIA: ICD-10-CM

## 2024-05-02 DIAGNOSIS — I48.0 PAROXYSMAL ATRIAL FIBRILLATION (MULTI): Primary | ICD-10-CM

## 2024-05-02 DIAGNOSIS — E78.2 MIXED HYPERLIPIDEMIA: ICD-10-CM

## 2024-05-02 DIAGNOSIS — R30.0 DYSURIA: Primary | ICD-10-CM

## 2024-05-02 PROCEDURE — 87186 SC STD MICRODIL/AGAR DIL: CPT

## 2024-05-02 PROCEDURE — 99213 OFFICE O/P EST LOW 20 MIN: CPT | Performed by: INTERNAL MEDICINE

## 2024-05-02 PROCEDURE — 1159F MED LIST DOCD IN RCRD: CPT | Performed by: INTERNAL MEDICINE

## 2024-05-02 PROCEDURE — 87086 URINE CULTURE/COLONY COUNT: CPT

## 2024-05-02 NOTE — PROGRESS NOTES
Chief Complaint:   Follow-up     History Of Present Illness:    Mariposa Comer is a 73 y.o. female presenting for follow-up.  Overall doing very well from a cardiac standpoint this time.  Denies any angina or progressive dyspnea.  Notes rare palpitations that are not bothersome.  Compliant with medications.     Last Recorded Vitals:  Vitals:    05/02/24 1011   BP: 122/69   Pulse: 66   SpO2: 98%   Weight: 80.4 kg (177 lb 4 oz)       Past Medical History:  She has a past medical history of Acute upper respiratory infection (07/24/2023), Asymptomatic menopausal state, Encounter for full-term uncomplicated delivery ([facility]), Malignant neoplasm of connective and soft tissue of left lower limb, including hip (Multi), Menopausal and female climacteric states, Nausea, Old myocardial infarction, Personal history of other benign neoplasm, Personal history of other diseases of the digestive system, Personal history of other diseases of the respiratory system, Personal history of pneumonia (recurrent), Sore throat (03/22/2023), and Unspecified ovarian cyst, unspecified side.    Past Surgical History:  She has a past surgical history that includes Cholecystectomy (07/22/2014); Tonsillectomy (07/22/2014); Tubal ligation (07/22/2014); Colonoscopy (07/22/2014); Other surgical history (07/22/2014); Other surgical history (08/20/2021); Other surgical history (05/13/2020); Other surgical history (04/07/2020); Other surgical history (04/07/2020); Other surgical history (04/07/2020); and Other surgical history (01/06/2020).      Social History:  She reports that she quit smoking about 53 years ago. Her smoking use included cigarettes. She has never used smokeless tobacco. She reports that she does not drink alcohol and does not use drugs.    Family History:  Family History   Problem Relation Name Age of Onset    Hyperlipidemia Mother      Hypertension Mother      Other (Cardiac disorder) Mother      Heart attack Mother      Raynaud  syndrome Mother      Sjogren's syndrome Mother      Heart attack Father      Other (Other) Father          Allergies:  Omeprazole and Tramadol    Outpatient Medications:  Current Outpatient Medications   Medication Instructions    albuterol 90 mcg/actuation inhaler 2 puffs, inhalation, Every 4 hours PRN, USE 2 INHALATIONS ONE MINUTE APART EVERY 4 OR MORE HOURS AS NEEDED FOR WHEEZE    calcium carbonate-vitamin D3 1,000 mg-20 mcg (800 unit) tablet 1 Capful, oral    coenzyme Q10 400 mg, oral, Daily with breakfast    escitalopram (LEXAPRO) 10 mg, oral, Nightly    fluticasone (Flonase) 50 mcg/actuation nasal spray 2 sprays, Each Nostril, Daily    gabapentin (Neurontin) 100 mg capsule TAKE 1 CAPSULE in the morning, 1 capsule at noon and 2 capsules at bedtime    levocetirizine (XYZAL) 5 mg, oral    lovastatin (MEVACOR) 40 mg, oral, Nightly    mv,calcium,min/iron/folic/vitK (MULTI FOR HER ORAL) oral    Myrbetriq 50 mg, oral, Nightly    pantoprazole (PROTONIX) 40 mg, oral, Daily before breakfast, TAKE 1 TABLET Daily 30 minutes before breakfast    prothrombin time/INR test metr misc Use to test your INR 1-2x weekly, or as instructed.    psyllium (Metamucil, with sugar,) 3.4 gram packet 1 packet, oral, Daily, use 1 packet daily, if still having loose stool after 1 week, go up to 2 packets daily    warfarin (Coumadin) 1 mg tablet Take 2 tablets daily. Total daily dose 5 mg.    warfarin (Coumadin) 6 mg tablet Take half tablet once daily. Total daily dose 5 mg.       Physical Exam:  Constitutional:       Appearance: Healthy appearance. Not in distress.   Neck:      Vascular: No JVR. JVD normal.   Pulmonary:      Effort: Pulmonary effort is normal.      Breath sounds: Normal breath sounds. No wheezing. No rhonchi. No rales.   Chest:      Chest wall: Not tender to palpatation.   Cardiovascular:      Normal rate. Regular rhythm.      Murmurs: There is no murmur.   Edema:     Peripheral edema absent.   Abdominal:      General: Bowel  "sounds are normal.      Palpations: Abdomen is soft.      Tenderness: There is no abdominal tenderness.   Musculoskeletal: Normal range of motion. Skin:     General: Skin is warm and dry.   Neurological:      General: No focal deficit present.      Mental Status: Alert and oriented to person, place and time.           Last Labs:  CBC -  Lab Results   Component Value Date    WBC 5.0 07/25/2023    HGB 14.0 07/25/2023    HCT 45.1 07/25/2023    MCV 94 07/25/2023     07/25/2023       CMP -  Lab Results   Component Value Date    CALCIUM 9.6 07/25/2023    PROT 6.3 (L) 07/25/2023    ALBUMIN 4.2 07/25/2023    AST 20 07/25/2023    ALT 15 07/25/2023    ALKPHOS 79 07/25/2023    BILITOT 0.5 07/25/2023       LIPID PANEL -   Lab Results   Component Value Date    CHOL 172 07/25/2023    TRIG 103 07/25/2023    HDL 62.6 07/25/2023    CHHDL 2.7 07/25/2023    LDLF 89 07/25/2023    VLDL 21 07/25/2023       RENAL FUNCTION PANEL -   Lab Results   Component Value Date    GLUCOSE 83 07/25/2023     07/25/2023    K 4.3 07/25/2023     07/25/2023    CO2 29 07/25/2023    ANIONGAP 13 07/25/2023    BUN 24 (H) 07/25/2023    CREATININE 0.73 07/25/2023    CALCIUM 9.6 07/25/2023    ALBUMIN 4.2 07/25/2023        No results found for: \"BNP\", \"HGBA1C\"    Last Cardiology Tests:  ECG independently reviewed from April 2: Sinus rhythm, 1 PAC, rate 61    Echo 6/23/23:  1. Left ventricular systolic function is low normal with a 50-55% estimated ejection fraction.  2. Spectral Doppler shows a pseudonormal pattern of left ventricular diastolic filling.  3. RVSP within normal limits.    Assessment/Plan   Very pleasant 73 year-old female with remote MI (? stress cardiomyopathy and she reports clean cath 2006), DLD and atrial fibrillation s/p RFA,.  Overall doing well from a cardiac standpoint.  Blood pressure well-controlled without antihypertensive therapy, lipids well-controlled.  Plan to continue current warfarin and lovastatin, she follows up " with EP, we will see her again in 1 year or sooner if needed      Ciaran Garrido MD

## 2024-05-05 LAB — BACTERIA UR CULT: ABNORMAL

## 2024-05-06 DIAGNOSIS — N39.0 ACUTE UTI: Primary | ICD-10-CM

## 2024-05-06 RX ORDER — NITROFURANTOIN 25; 75 MG/1; MG/1
100 CAPSULE ORAL 2 TIMES DAILY
Qty: 10 CAPSULE | Refills: 0 | Status: SHIPPED | OUTPATIENT
Start: 2024-05-06 | End: 2024-05-11

## 2024-05-10 ENCOUNTER — ANTICOAGULATION - WARFARIN VISIT (OUTPATIENT)
Dept: PRIMARY CARE | Facility: CLINIC | Age: 74
End: 2024-05-10
Payer: MEDICARE

## 2024-05-10 LAB
INR IN PPP BY COAGULATION ASSAY EXTERNAL: 2.2
PROTHROMBIN TIME (PT) IN PPP BY COAGULATION ASSAY EXTERNAL: NORMAL SECONDS

## 2024-05-13 ENCOUNTER — APPOINTMENT (OUTPATIENT)
Dept: UROLOGY | Facility: CLINIC | Age: 74
End: 2024-05-13
Payer: MEDICARE

## 2024-05-14 ENCOUNTER — ANTICOAGULATION - WARFARIN VISIT (OUTPATIENT)
Dept: PRIMARY CARE | Facility: CLINIC | Age: 74
End: 2024-05-14
Payer: MEDICARE

## 2024-05-14 LAB
INR IN PPP BY COAGULATION ASSAY EXTERNAL: 2.4
PROTHROMBIN TIME (PT) IN PPP BY COAGULATION ASSAY EXTERNAL: NORMAL SECONDS

## 2024-05-20 ENCOUNTER — TELEPHONE (OUTPATIENT)
Dept: PRIMARY CARE | Facility: CLINIC | Age: 74
End: 2024-05-20
Payer: MEDICARE

## 2024-05-20 DIAGNOSIS — I48.0 PAROXYSMAL ATRIAL FIBRILLATION (MULTI): ICD-10-CM

## 2024-05-20 NOTE — TELEPHONE ENCOUNTER
MEDICATION REFILL    Pharmacy Name:  [facility] [id number](zip code) - [address]- # [phone]   Medication requested                  Warfarin  1 mg     Dosage       2 tabs daily  Quantity       30 days   Allergies    none given  Date of last visit     Date of Next Appointment [  ]

## 2024-05-21 ENCOUNTER — ANTICOAGULATION - WARFARIN VISIT (OUTPATIENT)
Dept: PRIMARY CARE | Facility: CLINIC | Age: 74
End: 2024-05-21
Payer: MEDICARE

## 2024-05-21 LAB
INR IN PPP BY COAGULATION ASSAY EXTERNAL: 2.1
PROTHROMBIN TIME (PT) IN PPP BY COAGULATION ASSAY EXTERNAL: NORMAL SECONDS

## 2024-05-21 RX ORDER — WARFARIN 1 MG/1
TABLET ORAL
Qty: 180 TABLET | Refills: 1 | Status: SHIPPED | OUTPATIENT
Start: 2024-05-21

## 2024-05-22 ENCOUNTER — TELEPHONE (OUTPATIENT)
Dept: PRIMARY CARE | Facility: CLINIC | Age: 74
End: 2024-05-22
Payer: MEDICARE

## 2024-05-22 DIAGNOSIS — M54.32 SCIATICA OF LEFT SIDE: ICD-10-CM

## 2024-05-22 NOTE — TELEPHONE ENCOUNTER
MEDICATION REFILL    Pharmacy Name:   [facility]  Medication requested             Gabapentin   100 mg  Dosage    1 AM / 1 AFTERNOON / 2 BEDTIME  Quantity     90 days   Allergies    none given  Date of last visit   12/18/2023  Date of Next Appointment 09/05/2024

## 2024-05-24 RX ORDER — GABAPENTIN 100 MG/1
CAPSULE ORAL
Qty: 360 CAPSULE | Refills: 1 | Status: SHIPPED | OUTPATIENT
Start: 2024-05-24

## 2024-05-28 ENCOUNTER — ANTICOAGULATION - WARFARIN VISIT (OUTPATIENT)
Dept: PRIMARY CARE | Facility: CLINIC | Age: 74
End: 2024-05-28
Payer: MEDICARE

## 2024-05-28 LAB
INR IN PPP BY COAGULATION ASSAY EXTERNAL: 2.3
PROTHROMBIN TIME (PT) IN PPP BY COAGULATION ASSAY EXTERNAL: NORMAL SECONDS

## 2024-06-04 ENCOUNTER — ANTICOAGULATION - WARFARIN VISIT (OUTPATIENT)
Dept: PRIMARY CARE | Facility: CLINIC | Age: 74
End: 2024-06-04
Payer: MEDICARE

## 2024-06-06 ENCOUNTER — TELEPHONE (OUTPATIENT)
Dept: PRIMARY CARE | Facility: CLINIC | Age: 74
End: 2024-06-06
Payer: MEDICARE

## 2024-06-06 DIAGNOSIS — I48.91 ATRIAL FIBRILLATION, UNSPECIFIED TYPE (MULTI): ICD-10-CM

## 2024-06-07 RX ORDER — WARFARIN 6 MG/1
TABLET ORAL
Qty: 45 TABLET | Refills: 2 | Status: SHIPPED | OUTPATIENT
Start: 2024-06-07

## 2024-06-11 ENCOUNTER — ANTICOAGULATION - WARFARIN VISIT (OUTPATIENT)
Dept: PRIMARY CARE | Facility: CLINIC | Age: 74
End: 2024-06-11
Payer: MEDICARE

## 2024-06-13 ENCOUNTER — TELEPHONE (OUTPATIENT)
Dept: PRIMARY CARE | Facility: CLINIC | Age: 74
End: 2024-06-13
Payer: MEDICARE

## 2024-06-13 DIAGNOSIS — I48.0 PAROXYSMAL ATRIAL FIBRILLATION (MULTI): ICD-10-CM

## 2024-06-13 NOTE — TELEPHONE ENCOUNTER
MEDICATION REFILL    Pt needs this Rx sent to mail order - cannot fill the rest that was sent to [facility].     Pharmacy Name:   [facility] mail order   Medication requested                  Warfarin 1 mg    Dosage  2 x daily with 3 mg = 5 mg daily  Quantity       90 days   Allergies    none given  Date of last visit   10/20/2023  Date of Next Appointment    09/05/2024

## 2024-06-18 ENCOUNTER — ANTICOAGULATION - WARFARIN VISIT (OUTPATIENT)
Dept: PRIMARY CARE | Facility: CLINIC | Age: 74
End: 2024-06-18
Payer: MEDICARE

## 2024-06-18 RX ORDER — WARFARIN 1 MG/1
TABLET ORAL
Qty: 180 TABLET | Refills: 1 | Status: SHIPPED | OUTPATIENT
Start: 2024-06-18

## 2024-06-26 ENCOUNTER — ANTICOAGULATION - WARFARIN VISIT (OUTPATIENT)
Dept: PRIMARY CARE | Facility: CLINIC | Age: 74
End: 2024-06-26
Payer: MEDICARE

## 2024-07-02 ENCOUNTER — ANTICOAGULATION - WARFARIN VISIT (OUTPATIENT)
Dept: PRIMARY CARE | Facility: CLINIC | Age: 74
End: 2024-07-02
Payer: MEDICARE

## 2024-07-02 LAB
INR IN PPP BY COAGULATION ASSAY EXTERNAL: 2.6
PROTHROMBIN TIME (PT) IN PPP BY COAGULATION ASSAY EXTERNAL: NORMAL SECONDS

## 2024-07-10 ENCOUNTER — ANTICOAGULATION - WARFARIN VISIT (OUTPATIENT)
Dept: PRIMARY CARE | Facility: CLINIC | Age: 74
End: 2024-07-10
Payer: MEDICARE

## 2024-07-10 LAB
INR IN PPP BY COAGULATION ASSAY EXTERNAL: 2.1
PROTHROMBIN TIME (PT) IN PPP BY COAGULATION ASSAY EXTERNAL: NORMAL

## 2024-07-16 ENCOUNTER — ANTICOAGULATION - WARFARIN VISIT (OUTPATIENT)
Dept: PRIMARY CARE | Facility: CLINIC | Age: 74
End: 2024-07-16
Payer: MEDICARE

## 2024-07-16 ENCOUNTER — LAB (OUTPATIENT)
Dept: LAB | Facility: LAB | Age: 74
End: 2024-07-16
Payer: MEDICARE

## 2024-07-16 DIAGNOSIS — N32.81 OAB (OVERACTIVE BLADDER): Primary | ICD-10-CM

## 2024-07-16 DIAGNOSIS — N39.0 RECURRENT UTI: ICD-10-CM

## 2024-07-16 DIAGNOSIS — N32.81 OAB (OVERACTIVE BLADDER): ICD-10-CM

## 2024-07-16 LAB
INR IN PPP BY COAGULATION ASSAY EXTERNAL: 2.6
INR IN PPP BY COAGULATION ASSAY EXTERNAL: 2.6
PROTHROMBIN TIME (PT) IN PPP BY COAGULATION ASSAY EXTERNAL: NORMAL
PROTHROMBIN TIME (PT) IN PPP BY COAGULATION ASSAY EXTERNAL: NORMAL

## 2024-07-16 PROCEDURE — 87086 URINE CULTURE/COLONY COUNT: CPT

## 2024-07-16 PROCEDURE — 87186 SC STD MICRODIL/AGAR DIL: CPT

## 2024-07-19 LAB — BACTERIA UR CULT: ABNORMAL

## 2024-07-22 DIAGNOSIS — R30.0 DYSURIA: Primary | ICD-10-CM

## 2024-07-22 RX ORDER — NITROFURANTOIN 25; 75 MG/1; MG/1
CAPSULE ORAL
Qty: 100 CAPSULE | Refills: 0 | Status: SHIPPED | OUTPATIENT
Start: 2024-07-22

## 2024-07-23 ENCOUNTER — ANTICOAGULATION - WARFARIN VISIT (OUTPATIENT)
Dept: PRIMARY CARE | Facility: CLINIC | Age: 74
End: 2024-07-23
Payer: MEDICARE

## 2024-07-23 LAB
INR IN PPP BY COAGULATION ASSAY EXTERNAL: 2.5
PROTHROMBIN TIME (PT) IN PPP BY COAGULATION ASSAY EXTERNAL: NORMAL

## 2024-07-31 ENCOUNTER — ANTICOAGULATION - WARFARIN VISIT (OUTPATIENT)
Dept: PRIMARY CARE | Facility: CLINIC | Age: 74
End: 2024-07-31
Payer: MEDICARE

## 2024-07-31 DIAGNOSIS — I48.0 PAROXYSMAL ATRIAL FIBRILLATION (MULTI): ICD-10-CM

## 2024-07-31 LAB
POC INR: 2.4
POC PROTHROMBIN TIME: NORMAL

## 2024-07-31 PROCEDURE — 85610 PROTHROMBIN TIME: CPT | Performed by: FAMILY MEDICINE

## 2024-08-01 ENCOUNTER — OFFICE VISIT (OUTPATIENT)
Dept: PRIMARY CARE | Facility: CLINIC | Age: 74
End: 2024-08-01
Payer: MEDICARE

## 2024-08-01 VITALS
OXYGEN SATURATION: 98 % | SYSTOLIC BLOOD PRESSURE: 116 MMHG | WEIGHT: 171.6 LBS | BODY MASS INDEX: 30.4 KG/M2 | DIASTOLIC BLOOD PRESSURE: 68 MMHG | HEART RATE: 63 BPM

## 2024-08-01 DIAGNOSIS — L51.9 ERYTHEMA MULTIFORME: Primary | ICD-10-CM

## 2024-08-01 PROCEDURE — 1036F TOBACCO NON-USER: CPT | Performed by: NURSE PRACTITIONER

## 2024-08-01 PROCEDURE — 1159F MED LIST DOCD IN RCRD: CPT | Performed by: NURSE PRACTITIONER

## 2024-08-01 PROCEDURE — 99213 OFFICE O/P EST LOW 20 MIN: CPT | Performed by: NURSE PRACTITIONER

## 2024-08-01 RX ORDER — HYDROCORTISONE 25 MG/G
CREAM TOPICAL 2 TIMES DAILY PRN
Qty: 30 G | Refills: 2 | Status: SHIPPED | OUTPATIENT
Start: 2024-08-01 | End: 2025-08-01

## 2024-08-01 RX ORDER — METHYLPREDNISOLONE 4 MG/1
TABLET ORAL
Qty: 21 TABLET | Refills: 0 | Status: SHIPPED | OUTPATIENT
Start: 2024-08-01 | End: 2024-08-08

## 2024-08-01 RX ORDER — HYDROXYZINE HYDROCHLORIDE 25 MG/1
25 TABLET, FILM COATED ORAL EVERY 8 HOURS PRN
Qty: 21 TABLET | Refills: 0 | Status: SHIPPED | OUTPATIENT
Start: 2024-08-01 | End: 2024-08-08

## 2024-08-01 ASSESSMENT — ENCOUNTER SYMPTOMS
EYE PAIN: 0
HEADACHES: 0
ABDOMINAL DISTENTION: 0
ADENOPATHY: 0
COUGH: 0
DIZZINESS: 0
FEVER: 0
BRUISES/BLEEDS EASILY: 0
DIFFICULTY URINATING: 0
CONSTIPATION: 0
ROS SKIN COMMENTS: SEE HPI
NAUSEA: 0
SEIZURES: 0
FATIGUE: 0
WEAKNESS: 0
CHILLS: 0
SHORTNESS OF BREATH: 0
MYALGIAS: 0
COLOR CHANGE: 0
WOUND: 0
WHEEZING: 0
JOINT SWELLING: 0
ABDOMINAL PAIN: 0
PALPITATIONS: 0
DIARRHEA: 0
TROUBLE SWALLOWING: 0

## 2024-08-01 NOTE — PROGRESS NOTES
Subjective   Patient ID: Mariposa Cmoer is a 73 y.o. female who presents for hive like rash (7/31/24 hive/rash are all over her body, started yesterday in the groin and now has starting spreading.  7/30 you a miquel tea at a restaurant and it made it instantly sick, she has had miquel before but not like this drink.  Pt also has UTI see Dr Irby and is on Nitrofurantoin for 90 days and is on day 6.  PT also has been cleaning closets and stirring up dust yesterday.  ).    Patient seen today due to acute concerns of rash.  Patient seen sitting up in office, somewhat uncomfortable appearing, with spouse present for assessment.  Patient is alert, oriented and appears in fair spirits.  Patient reports that she woke up yesterday with irritation to the skin near her groin.  This rash/irritation has since spread down her thighs, is on her abdomen and back, on her hands and near her eye.  She states that the rash is very itchy and burns.  She trialed over-the-counter hydrocortisone and Benadryl with minimal relief yesterday.  Patient reports initiating a new antibiotic 6 days ago per her urologist.  She also states that she is in the yard sometimes gardening.  No known sick contacts.  Patient denies ever having symptoms like this before.   is asymptomatic.  No other acute concerns voiced at this time.         Current Outpatient Medications on File Prior to Visit   Medication Sig Dispense Refill    albuterol 90 mcg/actuation inhaler Inhale 2 puffs every 4 hours if needed for wheezing. USE 2 INHALATIONS ONE MINUTE APART EVERY 4 OR MORE HOURS AS NEEDED FOR WHEEZE      calcium carbonate-vitamin D3 1,000 mg-20 mcg (800 unit) tablet Take 1 Capful by mouth.      coenzyme Q10 400 mg capsule Take 1 capsule (400 mg) by mouth once daily with breakfast.      fluticasone (Flonase) 50 mcg/actuation nasal spray Administer 2 sprays into each nostril once daily. 48 g 3    gabapentin (Neurontin) 100 mg capsule TAKE 1 CAPSULE in the  morning, 1 capsule at noon and 2 capsules at bedtime 360 capsule 1    levocetirizine (Xyzal) 5 mg tablet Take 1 tablet (5 mg) by mouth.      lovastatin (Mevacor) 40 mg tablet Take 1 tablet (40 mg) by mouth once daily at bedtime. 90 tablet 1    mv,calcium,min/iron/folic/vitK (MULTI FOR HER ORAL) Take by mouth.      nitrofurantoin, macrocrystal-monohydrate, (Macrobid) 100 mg capsule Take 2x/day for 5 days then daily for 90 days 100 capsule 0    pantoprazole (ProtoNix) 40 mg EC tablet Take 1 tablet (40 mg) by mouth once daily in the morning. Take before meals. TAKE 1 TABLET Daily 30 minutes before breakfast 90 tablet 1    prothrombin time/INR test metr misc Use to test your INR 1-2x weekly, or as instructed. 1 each 0    warfarin (Coumadin) 1 mg tablet Take 2 tablets daily. Total daily dose 5 mg. Or as directed. 180 tablet 1    warfarin (Coumadin) 6 mg tablet Take half tablet once daily. Total daily dose 5 mg. 45 tablet 2    escitalopram (Lexapro) 10 mg tablet Take 1 tablet (10 mg) by mouth once daily at bedtime. 90 tablet 3    Myrbetriq 50 mg tablet extended release 24 hr 24 hr tablet Take 1 tablet (50 mg) by mouth once daily at bedtime. (Patient not taking: Reported on 2024) 90 tablet 3    psyllium (Metamucil, with sugar,) 3.4 gram packet Take 1 packet by mouth early in the morning.. use 1 packet daily, if still having loose stool after 1 week, go up to 2 packets daily       No current facility-administered medications on file prior to visit.       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    Menopausal and female climacteric states     Hot flash, menopausal    Nausea      Nausea in adult    Old myocardial infarction     History of acute myocardial infarction    Personal history of other benign neoplasm     History of uterine leiomyoma    Personal history of other diseases of the digestive system     History of gastroesophageal reflux (GERD)    Personal history of other diseases of the respiratory system     History of influenza    Personal history of pneumonia (recurrent)     History of pneumonia    Sore throat 03/22/2023    Unspecified ovarian cyst, unspecified side     Benign ovarian cyst        Past Surgical History:   Procedure Laterality Date    CHOLECYSTECTOMY  07/22/2014    Cholecystectomy    COLONOSCOPY  07/22/2014    Colonoscopy (Fiberoptic)    OTHER SURGICAL HISTORY  07/22/2014    Cath Ablation Of Bypass Tract For V-Tach By Echo Guidance    OTHER SURGICAL HISTORY  08/20/2021    Knee surgery    OTHER SURGICAL HISTORY  05/13/2020    Tumor ablation    OTHER SURGICAL HISTORY  04/07/2020    Esophageal dilation    OTHER SURGICAL HISTORY  04/07/2020    Esophagogastroduodenoscopy    OTHER SURGICAL HISTORY  04/07/2020    [place] tooth extraction    OTHER SURGICAL HISTORY  01/06/2020    Tumor excision    TONSILLECTOMY  07/22/2014    Tonsillectomy    TUBAL LIGATION  07/22/2014    Tubal Ligation        Family History   Problem Relation Name Age of Onset    Hyperlipidemia Mother      Hypertension Mother      Other (Cardiac disorder) Mother      Heart attack Mother      Raynaud syndrome Mother      Sjogren's syndrome Mother      Heart attack Father      Other (Other) Father          Review of Systems   Constitutional:  Negative for chills, fatigue and fever.   HENT:  Negative for dental problem and trouble swallowing.    Eyes:  Negative for pain and visual disturbance.   Respiratory:  Negative for cough, shortness of breath and wheezing.    Cardiovascular:  Negative for chest pain, palpitations and leg swelling.   Gastrointestinal:  Negative for abdominal distention, abdominal pain,  constipation, diarrhea and nausea.   Endocrine: Negative for cold intolerance and heat intolerance.   Genitourinary:  Negative for difficulty urinating.   Musculoskeletal:  Negative for gait problem, joint swelling and myalgias.   Skin:  Positive for rash. Negative for color change, pallor and wound.        See HPI   Allergic/Immunologic: Negative for environmental allergies and food allergies.   Neurological:  Negative for dizziness, seizures, weakness and headaches.   Hematological:  Negative for adenopathy. Does not bruise/bleed easily.   Psychiatric/Behavioral:  Negative for behavioral problems.    All other systems reviewed and are negative.      Objective   /68   Pulse 63   Wt 77.8 kg (171 lb 9.6 oz)   SpO2 98%   BMI 30.40 kg/m²     Physical Exam  Constitutional:       General: She is not in acute distress.     Appearance: Normal appearance. She is not toxic-appearing.   HENT:      Head: Normocephalic and atraumatic.      Right Ear: Tympanic membrane, ear canal and external ear normal.      Left Ear: Tympanic membrane, ear canal and external ear normal.      Nose: Nose normal.      Mouth/Throat:      Mouth: Mucous membranes are moist.      Pharynx: Oropharynx is clear.   Eyes:      Extraocular Movements: Extraocular movements intact.      Conjunctiva/sclera: Conjunctivae normal.      Pupils: Pupils are equal, round, and reactive to light.   Cardiovascular:      Rate and Rhythm: Normal rate and regular rhythm.      Pulses: Normal pulses.      Heart sounds: Normal heart sounds. No murmur heard.  Pulmonary:      Effort: Pulmonary effort is normal.      Breath sounds: Normal breath sounds. No wheezing.   Abdominal:      General: Bowel sounds are normal.      Palpations: Abdomen is soft.   Musculoskeletal:         General: No swelling. Normal range of motion.      Cervical back: Normal range of motion and neck supple.   Skin:     General: Skin is warm and dry.      Findings: Rash present.      Comments:  Rash to BLE, near groin, hands, ABD, back and near rigth eye. Rash consists mostly of lesions round, erythematous papules. No warmth, drainage or foul odor   Neurological:      General: No focal deficit present.      Mental Status: She is alert and oriented to person, place, and time. Mental status is at baseline.      Cranial Nerves: No cranial nerve deficit.      Motor: No weakness.   Psychiatric:         Mood and Affect: Mood normal.         Behavior: Behavior normal.         Thought Content: Thought content normal.         Judgment: Judgment normal.         Assessment/Plan   Problem List Items Addressed This Visit             ICD-10-CM    Erythema multiforme - Primary L51.9    Relevant Medications    methylPREDNISolone (Medrol Dospak) 4 mg tablets    hydrocortisone 2.5 % cream    hydrOXYzine HCL (Atarax) 25 mg tablet     Will initiate course of oral and topical steroids as well as as needed hydroxyzine for persistent pruritus.  Patient to notify provider for any persistent or worsening skin/rash concerns.  Patient instructed to notify her urologist later today of concerns for reaction to nitrofurantoin.  She has been instructed to stop this medication immediately.

## 2024-08-01 NOTE — PATIENT INSTRUCTIONS
Please call your urologist to discuss stopping the antibiotics as your have developed suspected erythema multiforme.     It is recommended that you stop the antibiotic!    Oral steroids, topical steroids and medication for itching has been sent to your pharmacy    Please notify the office for any persistent or worsening skin concerns

## 2024-08-02 DIAGNOSIS — N39.0 RECURRENT UTI: Primary | ICD-10-CM

## 2024-08-02 RX ORDER — CEPHALEXIN 250 MG/1
250 CAPSULE ORAL DAILY
Qty: 90 CAPSULE | Refills: 0 | Status: SHIPPED | OUTPATIENT
Start: 2024-08-02 | End: 2024-10-31

## 2024-08-08 ENCOUNTER — ANTICOAGULATION - WARFARIN VISIT (OUTPATIENT)
Dept: PRIMARY CARE | Facility: CLINIC | Age: 74
End: 2024-08-08
Payer: MEDICARE

## 2024-08-08 LAB
INR IN PPP BY COAGULATION ASSAY EXTERNAL: 2.8
PROTHROMBIN TIME (PT) IN PPP BY COAGULATION ASSAY EXTERNAL: NORMAL

## 2024-08-08 NOTE — PROGRESS NOTES
Pt called and informed to hold warfarin on the day she starts the antibiotic then check inr 3-4 days after.

## 2024-08-12 ENCOUNTER — TELEPHONE (OUTPATIENT)
Dept: PRIMARY CARE | Facility: CLINIC | Age: 74
End: 2024-08-12
Payer: MEDICARE

## 2024-08-12 NOTE — TELEPHONE ENCOUNTER
Preston, prior auth for pt's Hydroxyzine, needs a few more questions answered for clarification.  He can be called at [phone].  He stated they were faxing the request also, and this could be faxed back at [phone].

## 2024-08-13 ENCOUNTER — ANTICOAGULATION - WARFARIN VISIT (OUTPATIENT)
Dept: PRIMARY CARE | Facility: CLINIC | Age: 74
End: 2024-08-13
Payer: MEDICARE

## 2024-08-13 LAB
INR IN PPP BY COAGULATION ASSAY EXTERNAL: 2.7
PROTHROMBIN TIME (PT) IN PPP BY COAGULATION ASSAY EXTERNAL: NORMAL

## 2024-08-19 ENCOUNTER — TELEPHONE (OUTPATIENT)
Dept: PRIMARY CARE | Facility: CLINIC | Age: 74
End: 2024-08-19

## 2024-08-19 NOTE — TELEPHONE ENCOUNTER
PT is asking for lab orders for liver, and cholesterol and any other tests Jorge would like her to have done for her next appt. September 5. Please advise pt if Jorge would add orders. [phone]

## 2024-08-21 ENCOUNTER — ANTICOAGULATION - WARFARIN VISIT (OUTPATIENT)
Dept: PRIMARY CARE | Facility: CLINIC | Age: 74
End: 2024-08-21
Payer: MEDICARE

## 2024-08-27 ENCOUNTER — ANTICOAGULATION - WARFARIN VISIT (OUTPATIENT)
Dept: PRIMARY CARE | Facility: CLINIC | Age: 74
End: 2024-08-27
Payer: MEDICARE

## 2024-08-27 LAB
INR IN PPP BY COAGULATION ASSAY EXTERNAL: 2.5
INR IN PPP BY COAGULATION ASSAY EXTERNAL: 2.5
PROTHROMBIN TIME (PT) IN PPP BY COAGULATION ASSAY EXTERNAL: NORMAL
PROTHROMBIN TIME (PT) IN PPP BY COAGULATION ASSAY EXTERNAL: NORMAL

## 2024-08-30 ENCOUNTER — TELEPHONE (OUTPATIENT)
Dept: PRIMARY CARE | Facility: CLINIC | Age: 74
End: 2024-08-30
Payer: MEDICARE

## 2024-08-30 DIAGNOSIS — F41.9 ANXIETY: ICD-10-CM

## 2024-08-30 DIAGNOSIS — K21.00 GASTROESOPHAGEAL REFLUX DISEASE WITH ESOPHAGITIS WITHOUT HEMORRHAGE: ICD-10-CM

## 2024-08-30 DIAGNOSIS — E78.5 HYPERLIPIDEMIA, UNSPECIFIED HYPERLIPIDEMIA TYPE: ICD-10-CM

## 2024-08-30 NOTE — TELEPHONE ENCOUNTER
MEDICATION REFILL    Pharmacy Name:   [facility] /   Medication requested (pt has 0 on hand)       Escitalopram  10 mg   1x daily    Pharmacy Name:   [facility]  Medication requested        Pantoprazole 40 mg   1x daily       Lovastatin   40 mg    1 x daily  Dosage [ see above ]   Quantity       90 days    Allergies    none given  Date of last visit    01/30/2024  Date of Next Appointment   09/05/2024

## 2024-09-03 RX ORDER — PANTOPRAZOLE SODIUM 40 MG/1
40 TABLET, DELAYED RELEASE ORAL
Qty: 90 TABLET | Refills: 0 | Status: SHIPPED | OUTPATIENT
Start: 2024-09-03 | End: 2024-09-05 | Stop reason: SDUPTHER

## 2024-09-03 RX ORDER — LOVASTATIN 40 MG/1
40 TABLET ORAL NIGHTLY
Qty: 90 TABLET | Refills: 0 | Status: SHIPPED | OUTPATIENT
Start: 2024-09-03 | End: 2024-09-05 | Stop reason: SDUPTHER

## 2024-09-03 RX ORDER — ESCITALOPRAM OXALATE 10 MG/1
10 TABLET ORAL NIGHTLY
Qty: 90 TABLET | Refills: 0 | Status: SHIPPED | OUTPATIENT
Start: 2024-09-03 | End: 2025-09-03

## 2024-09-04 PROBLEM — D17.24 LIPOMA OF LEFT LOWER EXTREMITY: Status: ACTIVE | Noted: 2024-09-04

## 2024-09-04 PROBLEM — J30.9 ALLERGIC RHINITIS: Status: ACTIVE | Noted: 2024-09-04

## 2024-09-04 PROBLEM — D89.89: Status: ACTIVE | Noted: 2023-09-28

## 2024-09-04 PROBLEM — I25.2 HISTORY OF MYOCARDIAL INFARCTION: Status: ACTIVE | Noted: 2024-09-04

## 2024-09-04 PROBLEM — D48.19: Status: ACTIVE | Noted: 2024-09-04

## 2024-09-04 PROBLEM — R15.9 INCONTINENCE OF FECES: Status: ACTIVE | Noted: 2024-09-04

## 2024-09-04 PROBLEM — Z86.19 HISTORY OF VIRAL INFECTION: Status: ACTIVE | Noted: 2024-09-04

## 2024-09-04 PROBLEM — N83.209 BENIGN OVARIAN CYST: Status: ACTIVE | Noted: 2024-09-04

## 2024-09-04 PROBLEM — K21.9 GASTROESOPHAGEAL REFLUX DISEASE: Status: ACTIVE | Noted: 2023-03-22

## 2024-09-04 PROBLEM — U07.1 COVID-19: Status: RESOLVED | Noted: 2023-10-20 | Resolved: 2024-09-04

## 2024-09-04 PROBLEM — E66.9 OBESITY WITH BODY MASS INDEX 30 OR GREATER: Status: ACTIVE | Noted: 2024-09-04

## 2024-09-04 PROBLEM — N95.1 HOT FLASHES DUE TO MENOPAUSE: Status: ACTIVE | Noted: 2024-09-04

## 2024-09-04 PROBLEM — N32.81 OVERACTIVE BLADDER: Status: ACTIVE | Noted: 2024-09-04

## 2024-09-05 ENCOUNTER — APPOINTMENT (OUTPATIENT)
Dept: PRIMARY CARE | Facility: CLINIC | Age: 74
End: 2024-09-05
Payer: MEDICARE

## 2024-09-05 ENCOUNTER — LAB (OUTPATIENT)
Dept: LAB | Facility: LAB | Age: 74
End: 2024-09-05
Payer: MEDICARE

## 2024-09-05 VITALS
WEIGHT: 172.5 LBS | HEART RATE: 51 BPM | BODY MASS INDEX: 30.56 KG/M2 | HEIGHT: 63 IN | DIASTOLIC BLOOD PRESSURE: 66 MMHG | OXYGEN SATURATION: 95 % | SYSTOLIC BLOOD PRESSURE: 121 MMHG

## 2024-09-05 DIAGNOSIS — K21.9 GASTROESOPHAGEAL REFLUX DISEASE, UNSPECIFIED WHETHER ESOPHAGITIS PRESENT: ICD-10-CM

## 2024-09-05 DIAGNOSIS — D89.89 ANTIBODY TO EXTRACTABLE NUCLEAR ANTIGEN (ENA) POSITIVE (MULTI): ICD-10-CM

## 2024-09-05 DIAGNOSIS — H69.93 DYSFUNCTION OF BOTH EUSTACHIAN TUBES: ICD-10-CM

## 2024-09-05 DIAGNOSIS — I48.0 PAROXYSMAL ATRIAL FIBRILLATION (MULTI): ICD-10-CM

## 2024-09-05 DIAGNOSIS — Z00.00 MEDICARE ANNUAL WELLNESS VISIT, SUBSEQUENT: Primary | ICD-10-CM

## 2024-09-05 DIAGNOSIS — F41.9 ANXIETY: ICD-10-CM

## 2024-09-05 DIAGNOSIS — Z12.31 ENCOUNTER FOR SCREENING MAMMOGRAM FOR BREAST CANCER: ICD-10-CM

## 2024-09-05 DIAGNOSIS — E78.2 MIXED HYPERLIPIDEMIA: ICD-10-CM

## 2024-09-05 DIAGNOSIS — G25.81 RESTLESS LEGS: ICD-10-CM

## 2024-09-05 LAB
ALBUMIN SERPL BCP-MCNC: 4.4 G/DL (ref 3.4–5)
ALP SERPL-CCNC: 82 U/L (ref 33–136)
ALT SERPL W P-5'-P-CCNC: 19 U/L (ref 7–45)
ANION GAP SERPL CALC-SCNC: 11 MMOL/L (ref 10–20)
AST SERPL W P-5'-P-CCNC: 21 U/L (ref 9–39)
BILIRUB SERPL-MCNC: 0.6 MG/DL (ref 0–1.2)
BUN SERPL-MCNC: 21 MG/DL (ref 6–23)
CALCIUM SERPL-MCNC: 9.3 MG/DL (ref 8.6–10.3)
CHLORIDE SERPL-SCNC: 106 MMOL/L (ref 98–107)
CO2 SERPL-SCNC: 32 MMOL/L (ref 21–32)
CREAT SERPL-MCNC: 0.74 MG/DL (ref 0.5–1.05)
EGFRCR SERPLBLD CKD-EPI 2021: 87 ML/MIN/1.73M*2
GLUCOSE SERPL-MCNC: 81 MG/DL (ref 74–99)
POTASSIUM SERPL-SCNC: 5 MMOL/L (ref 3.5–5.3)
PROT SERPL-MCNC: 6.5 G/DL (ref 6.4–8.2)
SODIUM SERPL-SCNC: 144 MMOL/L (ref 136–145)

## 2024-09-05 PROCEDURE — 80053 COMPREHEN METABOLIC PANEL: CPT

## 2024-09-05 PROCEDURE — 1160F RVW MEDS BY RX/DR IN RCRD: CPT | Performed by: FAMILY MEDICINE

## 2024-09-05 PROCEDURE — 1159F MED LIST DOCD IN RCRD: CPT | Performed by: FAMILY MEDICINE

## 2024-09-05 PROCEDURE — 1123F ACP DISCUSS/DSCN MKR DOCD: CPT | Performed by: FAMILY MEDICINE

## 2024-09-05 PROCEDURE — 1170F FXNL STATUS ASSESSED: CPT | Performed by: FAMILY MEDICINE

## 2024-09-05 PROCEDURE — 3008F BODY MASS INDEX DOCD: CPT | Performed by: FAMILY MEDICINE

## 2024-09-05 PROCEDURE — 36415 COLL VENOUS BLD VENIPUNCTURE: CPT

## 2024-09-05 PROCEDURE — G0439 PPPS, SUBSEQ VISIT: HCPCS | Performed by: FAMILY MEDICINE

## 2024-09-05 PROCEDURE — 99214 OFFICE O/P EST MOD 30 MIN: CPT | Performed by: FAMILY MEDICINE

## 2024-09-05 PROCEDURE — 1158F ADVNC CARE PLAN TLK DOCD: CPT | Performed by: FAMILY MEDICINE

## 2024-09-05 PROCEDURE — 1036F TOBACCO NON-USER: CPT | Performed by: FAMILY MEDICINE

## 2024-09-05 RX ORDER — PANTOPRAZOLE SODIUM 40 MG/1
40 TABLET, DELAYED RELEASE ORAL
Qty: 100 TABLET | Refills: 1 | Status: SHIPPED | OUTPATIENT
Start: 2024-09-05

## 2024-09-05 RX ORDER — GABAPENTIN 100 MG/1
200 CAPSULE ORAL NIGHTLY
Qty: 200 CAPSULE | Refills: 1 | Status: SHIPPED | OUTPATIENT
Start: 2024-09-05

## 2024-09-05 RX ORDER — LOVASTATIN 40 MG/1
40 TABLET ORAL NIGHTLY
Qty: 100 TABLET | Refills: 1 | Status: SHIPPED | OUTPATIENT
Start: 2024-09-05

## 2024-09-05 RX ORDER — HYDROXYZINE HYDROCHLORIDE 10 MG/1
10-20 TABLET, FILM COATED ORAL EVERY 8 HOURS PRN
Qty: 60 TABLET | Refills: 0 | Status: SHIPPED | OUTPATIENT
Start: 2024-09-05

## 2024-09-05 ASSESSMENT — ENCOUNTER SYMPTOMS
APNEA: 0
DIZZINESS: 0
CHOKING: 0
CHILLS: 0
CHEST TIGHTNESS: 0
HEADACHES: 0
UNEXPECTED WEIGHT CHANGE: 0
LIGHT-HEADEDNESS: 0
SHORTNESS OF BREATH: 0
WHEEZING: 0
PALPITATIONS: 0
COUGH: 0
LOSS OF SENSATION IN FEET: 0
FEVER: 0
DEPRESSION: 0
OCCASIONAL FEELINGS OF UNSTEADINESS: 0
DIAPHORESIS: 0

## 2024-09-05 ASSESSMENT — ACTIVITIES OF DAILY LIVING (ADL)
DRESSING: INDEPENDENT
MANAGING_FINANCES: INDEPENDENT
TAKING_MEDICATION: INDEPENDENT
GROCERY_SHOPPING: INDEPENDENT
DOING_HOUSEWORK: INDEPENDENT
BATHING: INDEPENDENT

## 2024-09-05 NOTE — ASSESSMENT & PLAN NOTE
Continue Gabapentin at bedtime.  Orders:    gabapentin (Neurontin) 100 mg capsule; Take 2 capsules (200 mg) by mouth once daily at bedtime. TAKE 1 CAPSULE in the morning, 1 capsule at noon and 2 capsules at bedtime

## 2024-09-05 NOTE — PROGRESS NOTES
"Subjective   Reason for Visit: Mariposa Comer is an 73 y.o. female here for Medicare Annual Wellness Visit Subsequent (Pt presents for MCV, leg cramps waking her at night, FYI on 90 days cephalaxin, dermatology referral, sharp pain R side on occasion, ENT referral. No rx's needed. BL)     Past Medical, Surgical, and Family History reviewed and updated in chart.    Reviewed all medications by prescribing practitioner or clinical pharmacist (such as prescriptions, OTCs, herbal therapies and supplements) and documented in the medical record.    HPI  Historian(s): Self and     Generally feeling well. Hasn't taken Lexapro in 5 days, feels much better.    Reports last colonoscopy with Dr. Tapia within the last 4y, and due for repeat at 10y.    c/o difficulty popping ears when flying, ethmoid sinus pressure when singing. Requests referral to ENT.    Patient Care Team:  Minh Lopez DO as PCP - General (Family Medicine)  Minh Lopez DO as PCP - [employer]age PCP  Ciaran Garrido MD as Consulting Physician (Cardiology)  Whitney Mayorga MD as Consulting Physician (Cardiology)  Luis Eduardo Stafford MD as Referring Physician (Pulmonary Disease)  Jessica Tapia MD as Referring Physician (Gastroenterology)  Marlene Acosta DO as Obstetrician (Obstetrics and Gynecology)  Diego Irby MD as Surgeon (Obstetrics and Gynecology)     Review of Systems   Constitutional:  Negative for chills, diaphoresis, fever and unexpected weight change.   Eyes:  Negative for visual disturbance.   Respiratory:  Negative for apnea (no PND), cough, choking, chest tightness, shortness of breath and wheezing.    Cardiovascular:  Negative for chest pain, palpitations and leg swelling.   Neurological:  Negative for dizziness, syncope, light-headedness and headaches.   All other systems reviewed and are negative.      Objective   Vitals:  /66   Pulse 51   Ht 1.6 m (5' 3\")   Wt 78.2 kg (172 lb 8 oz)   SpO2 " 95%   BMI 30.56 kg/m²             Physical Exam  Vitals and nursing note reviewed.   Constitutional:       General: She is not in acute distress.     Appearance: Normal appearance.      Comments: No assistive device presently being used.   HENT:      Head: Normocephalic and atraumatic.      Right Ear: Tympanic membrane and external ear normal. There is no impacted cerumen.      Left Ear: Tympanic membrane and external ear normal. There is no impacted cerumen.      Ears:      Comments: irritated EACs  Eyes:      General: No scleral icterus.     Extraocular Movements: Extraocular movements intact.      Conjunctiva/sclera: Conjunctivae normal.   Pulmonary:      Effort: Pulmonary effort is normal. No respiratory distress.   Musculoskeletal:      Right lower leg: No edema.      Left lower leg: No edema.   Skin:     General: Skin is warm and dry.      Coloration: Skin is not jaundiced.   Neurological:      Mental Status: She is alert and oriented to person, place, and time. Mental status is at baseline.   Psychiatric:         Behavior: Behavior normal.         Assessment & Plan  Medicare annual wellness visit, subsequent  73yF doing fairly well.       Paroxysmal atrial fibrillation (Multi)  Stable. Continue Warfarin.       Mixed hyperlipidemia  Continue statin.  Orders:    lovastatin (Mevacor) 40 mg tablet; Take 1 tablet (40 mg) by mouth once daily at bedtime.    Gastroesophageal reflux disease, unspecified whether esophagitis present  Well controlled.   Orders:    pantoprazole (ProtoNix) 40 mg EC tablet; Take 1 tablet (40 mg) by mouth once daily in the morning. Take before meals. TAKE 1 TABLET Daily 30 minutes before breakfast    Comprehensive Metabolic Panel; Future    Anxiety  Doing well without Lexapro. Try Hydroxyzine PRN.  Orders:    hydrOXYzine HCL (Atarax) 10 mg tablet; Take 1-2 tablets (10-20 mg) by mouth every 8 hours if needed for anxiety.    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  Orders:    gabapentin (Neurontin) 100 mg capsule; Take 2 capsules (200 mg) by mouth once daily at bedtime. TAKE 1 CAPSULE in the morning, 1 capsule at noon and 2 capsules at bedtime    Encounter for screening mammogram for breast cancer    Orders:    BI mammo bilateral screening tomosynthesis; Future    Dysfunction of both eustachian tubes  Try Flonase. Follow-up with ENT.

## 2024-09-05 NOTE — ASSESSMENT & PLAN NOTE
Continue statin.  Orders:    lovastatin (Mevacor) 40 mg tablet; Take 1 tablet (40 mg) by mouth once daily at bedtime.

## 2024-09-05 NOTE — PATIENT INSTRUCTIONS
Proton Pump Inhibitors (PPI, e.g., Prilosec/omeprazole, Nexium/esomeprazole, Protonix/pantoprazole, and others) may cause vitamin or mineral deficiencies, kidney damage, dementia, stomach polyps, fractures and thinning of the bones (osteoporosis), intestinal infections including C. diff., lupus. Some of these side effects are more likely with chronic use. There are other possible side effects, and it is recommended, as with any medication, to review all possible side effects. Chronic PPI use may be necessary in certain situations (e.g., [place] esophagus).    Please double-check with Dr. Tapia, to see when he wants you do have your next colonoscopy.    Try a nasal steroid spray for a couple weeks. This might help with the flying. Recommend avoiding use of Q-tips in the ears.    Otolarynglogy (ENT)  Dr. Tal Hendricks, Andrew Hamilton, Juan Luis Kerr ([place]) [phone]  Dr. Sakshi Acharya ([place]) [phone]  Dr. Madisyn Srivastava ([place]) [phone]  Dr. Aolnzo Greenberg ([place]) [phone]    Please return for a(n) follow-up appointment in 3 months, after tests to review results and options, earlier if any question or concern. Please schedule additional problem-focused appointment(s) to address additional problem(s).    Recommended vaccines:  Influenza, annual  Respiratory Syncytial Virus (RSV)  Shingrix (shingles) vaccine series  Avoid taking Biotin (a vitamin, shows up particularly in hair/nail supplements) for a week prior to any blood tests, as it can interfere with certain results. Fasting for labs means 12 hours, nothing to eat or drink, except water and medications, unless directed otherwise.    For assistance with scheduling referrals or consultations, please call [phone]. For laboratory, radiology, and other tests, please call [phone] ([phone] for pediatrics). Please review prescription inserts and published information for possible adverse effects of all medications. Return after  testing or consultation to review results and recommendations, if symptoms persist, change, worsen, or return, or if you have any question or concern. If you do not get results within 7-10 days, or you have any question or concern, please send a message, call the office ([phone]), or return to the office for a follow-up appointment. For non-emergencies, you may call the office. For emergencies, call 9-1-1 or go to the nearest Emergency Department. Please schedule additional appointment(s) to address concern(s) not addressed today.    In general, results are not released or discussed over the telephone, but at an appointment or via  [employer]. Results of tests done through [facility] are released via  [employer] (see below).  [email or web address]   [employer] support line: [phone]

## 2024-09-05 NOTE — ASSESSMENT & PLAN NOTE
Well controlled.   Orders:    pantoprazole (ProtoNix) 40 mg EC tablet; Take 1 tablet (40 mg) by mouth once daily in the morning. Take before meals. TAKE 1 TABLET Daily 30 minutes before breakfast    Comprehensive Metabolic Panel; Future

## 2024-09-05 NOTE — ASSESSMENT & PLAN NOTE
Doing well without Lexapro. Try Hydroxyzine PRN.  Orders:    hydrOXYzine HCL (Atarax) 10 mg tablet; Take 1-2 tablets (10-20 mg) by mouth every 8 hours if needed for anxiety.

## 2024-09-06 ENCOUNTER — TELEPHONE (OUTPATIENT)
Dept: PRIMARY CARE | Facility: CLINIC | Age: 74
End: 2024-09-06
Payer: MEDICARE

## 2024-09-06 NOTE — TELEPHONE ENCOUNTER
----- Message from Minh Lopez sent at 9/6/2024  9:44 AM EDT -----  Please let patient or patient's parent(s)/guardian(s) know result(s) normal or unremarkable.

## 2024-09-06 NOTE — TELEPHONE ENCOUNTER
Result Communication    Resulted Orders   Comprehensive Metabolic Panel   Result Value Ref Range    Glucose 81 74 - 99 mg/dL    Sodium 144 136 - 145 mmol/L    Potassium 5.0 3.5 - 5.3 mmol/L    Chloride 106 98 - 107 mmol/L    Bicarbonate 32 21 - 32 mmol/L    Anion Gap 11 10 - 20 mmol/L    Urea Nitrogen 21 6 - 23 mg/dL    Creatinine 0.74 0.50 - 1.05 mg/dL    eGFR 87 >60 mL/min/1.73m*2      Comment:      Calculations of estimated GFR are performed using the 2021 CKD-EPI Study Refit equation without the race variable for the IDMS-Traceable creatinine methods.  https://jasn.asnjournals.org/content/early/2021/09/22/ASN.9110830848    Calcium 9.3 8.6 - 10.3 mg/dL    Albumin 4.4 3.4 - 5.0 g/dL    Alkaline Phosphatase 82 33 - 136 U/L    Total Protein 6.5 6.4 - 8.2 g/dL    AST 21 9 - 39 U/L    Bilirubin, Total 0.6 0.0 - 1.2 mg/dL    ALT 19 7 - 45 U/L      Comment:      Patients treated with Sulfasalazine may generate falsely decreased results for ALT.       3:41 PM      Results were successfully communicated with the patient and they acknowledged their understanding.

## 2024-09-10 ENCOUNTER — ANTICOAGULATION - WARFARIN VISIT (OUTPATIENT)
Dept: PRIMARY CARE | Facility: CLINIC | Age: 74
End: 2024-09-10
Payer: MEDICARE

## 2024-09-10 LAB
INR IN PPP BY COAGULATION ASSAY EXTERNAL: 2.4
PROTHROMBIN TIME (PT) IN PPP BY COAGULATION ASSAY EXTERNAL: NORMAL

## 2024-09-10 NOTE — PROGRESS NOTES
Pt informed continue same dose recheck in 1 wk but patient will be going out of country and has chosen not to take INR machine with her.  
DC instructions

## 2024-10-01 ENCOUNTER — ANTICOAGULATION - WARFARIN VISIT (OUTPATIENT)
Dept: PRIMARY CARE | Facility: CLINIC | Age: 74
End: 2024-10-01
Payer: MEDICARE

## 2024-10-03 NOTE — PROGRESS NOTES
Spoke with pt, informed pt to keep current plan of 5 mg daily and recheck in 1 week per Dr. Lopez's orders.

## 2024-10-08 ENCOUNTER — ANTICOAGULATION - WARFARIN VISIT (OUTPATIENT)
Dept: PRIMARY CARE | Facility: CLINIC | Age: 74
End: 2024-10-08
Payer: MEDICARE

## 2024-10-08 LAB
INR IN PPP BY COAGULATION ASSAY EXTERNAL: 2.4
INR IN PPP BY COAGULATION ASSAY EXTERNAL: 2.5
PROTHROMBIN TIME (PT) IN PPP BY COAGULATION ASSAY EXTERNAL: NORMAL
PROTHROMBIN TIME (PT) IN PPP BY COAGULATION ASSAY EXTERNAL: NORMAL

## 2024-10-28 ENCOUNTER — HOSPITAL ENCOUNTER (OUTPATIENT)
Dept: RADIOLOGY | Facility: HOSPITAL | Age: 74
Discharge: HOME | End: 2024-10-28
Payer: MEDICARE

## 2024-10-28 VITALS — BODY MASS INDEX: 29.02 KG/M2 | HEIGHT: 64 IN | WEIGHT: 170 LBS

## 2024-10-28 DIAGNOSIS — Z12.31 ENCOUNTER FOR SCREENING MAMMOGRAM FOR BREAST CANCER: ICD-10-CM

## 2024-10-28 PROCEDURE — 77063 BREAST TOMOSYNTHESIS BI: CPT

## 2024-10-28 PROCEDURE — 77067 SCR MAMMO BI INCL CAD: CPT | Performed by: RADIOLOGY

## 2024-10-28 PROCEDURE — 77063 BREAST TOMOSYNTHESIS BI: CPT | Performed by: RADIOLOGY

## 2024-10-29 ENCOUNTER — ANTICOAGULATION - WARFARIN VISIT (OUTPATIENT)
Dept: PRIMARY CARE | Facility: CLINIC | Age: 74
End: 2024-10-29
Payer: MEDICARE

## 2024-10-30 ENCOUNTER — ANTICOAGULATION - WARFARIN VISIT (OUTPATIENT)
Dept: PRIMARY CARE | Facility: CLINIC | Age: 74
End: 2024-10-30
Payer: MEDICARE

## 2024-11-06 ENCOUNTER — ANTICOAGULATION - WARFARIN VISIT (OUTPATIENT)
Dept: PRIMARY CARE | Facility: CLINIC | Age: 74
End: 2024-11-06
Payer: MEDICARE

## 2024-11-06 LAB
INR IN PPP BY COAGULATION ASSAY EXTERNAL: 2.6
PROTHROMBIN TIME (PT) IN PPP BY COAGULATION ASSAY EXTERNAL: NORMAL

## 2024-11-12 ENCOUNTER — ANTICOAGULATION - WARFARIN VISIT (OUTPATIENT)
Dept: PRIMARY CARE | Facility: CLINIC | Age: 74
End: 2024-11-12
Payer: MEDICARE

## 2024-11-14 ENCOUNTER — APPOINTMENT (OUTPATIENT)
Dept: UROLOGY | Facility: CLINIC | Age: 74
End: 2024-11-14
Payer: MEDICARE

## 2024-11-14 DIAGNOSIS — N32.81 OAB (OVERACTIVE BLADDER): Primary | ICD-10-CM

## 2024-11-14 DIAGNOSIS — N39.0 RECURRENT UTI: ICD-10-CM

## 2024-11-14 PROCEDURE — 99214 OFFICE O/P EST MOD 30 MIN: CPT | Performed by: STUDENT IN AN ORGANIZED HEALTH CARE EDUCATION/TRAINING PROGRAM

## 2024-11-14 RX ORDER — METHENAMINE HIPPURATE 1000 MG/1
1 TABLET ORAL 2 TIMES DAILY
Qty: 180 TABLET | Refills: 3 | Status: SHIPPED | OUTPATIENT
Start: 2024-11-14 | End: 2025-11-14

## 2024-11-14 NOTE — PROGRESS NOTES
HISTORY OF PRESENT ILLNESS:  Mariposa Comer is a 73 y.o. female who presents today for a follow up visit. She has not had any urinary infections since July. She is doing okay regarding her UTI.  She is having some urgency again and is interested in another botox injection.          Past Medical History  She has a past medical history of Acute upper respiratory infection (07/24/2023), Asymptomatic menopausal state, Encounter for full-term uncomplicated delivery, Malignant neoplasm of connective and soft tissue of left lower limb, including hip (Multi), Menopausal and female climacteric states, Nausea, Old myocardial infarction, Personal history of other benign neoplasm, Personal history of other diseases of the digestive system, Personal history of other diseases of the respiratory system, Personal history of pneumonia (recurrent), Sore throat (03/22/2023), Unspecified ovarian cyst, unspecified side, and Vaginal delivery ([facility]) (09/04/2024).    Surgical History  She has a past surgical history that includes Cholecystectomy (07/22/2014); Tonsillectomy (07/22/2014); Tubal ligation (07/22/2014); Colonoscopy (07/22/2014); Other surgical history (07/22/2014); Other surgical history (08/20/2021); Other surgical history (05/13/2020); Other surgical history (04/07/2020); Other surgical history (04/07/2020); Other surgical history (04/07/2020); and Other surgical history (01/06/2020).     Social History  She reports that she quit smoking about 53 years ago. Her smoking use included cigarettes. She has never used smokeless tobacco. She reports that she does not drink alcohol and does not use drugs.    Family History  Family History   Problem Relation Name Age of Onset    Hyperlipidemia Mother      Hypertension Mother      Other (Cardiac disorder) Mother      Heart attack Mother      Raynaud syndrome Mother      Sjogren's syndrome Mother      Heart attack Father      Other (Other) Father          Allergies  Omeprazole,  "Tramadol, and Bactrim [sulfamethoxazole-trimethoprim]      A comprehensive 10+ review of systems was negative except for: see hpi                          PHYSICAL EXAMINATION:  BP Readings from Last 3 Encounters:   09/05/24 121/66   08/01/24 116/68   05/02/24 122/69      Wt Readings from Last 3 Encounters:   10/28/24 77.1 kg (170 lb)   09/05/24 78.2 kg (172 lb 8 oz)   08/01/24 77.8 kg (171 lb 9.6 oz)      BMI: Estimated body mass index is 29.18 kg/m² as calculated from the following:    Height as of 10/28/24: 1.626 m (5' 4\").    Weight as of 10/28/24: 77.1 kg (170 lb).  BSA: Estimated body surface area is 1.87 meters squared as calculated from the following:    Height as of 10/28/24: 1.626 m (5' 4\").    Weight as of 10/28/24: 77.1 kg (170 lb).  HEENT: Normocephalic, atraumatic, PER EOMI, nonicteric, trachea normal, thyroid normal, oropharynx normal.  CARDIAC: regular rate & rhythm, S1 & S2 normal.  No heaves, thrills, gallops or murmurs.  LUNGS: Clear to auscultation, no spinal or CV tenderness.  EXTREMITIES: No evidence of cyanosis, clubbing or edema.               Assessment:  73-year-old with OAB, urge incontinence, and fecal incontinence with constipation and genitourinary syndrome of menopause     GUSM:  -Continue using estradiol cream       Shaye:  -Rx methenamine   -Standing urine culture       FI:   -doing well with this Metamucil  -Discussed options for sacral neuromodulation      OAB/UUI  -Drink no more than 8 oz. of liiquid per hour  -Myrbetriq not covered by insurance, wants to proceed with botox  -Plan on UDS   -S/P botox, 100 units 3/28/24, sx wearing off, plan on another injection   -Will do another injection, symptoms returning           Follow up for botox       All questions and concerns were answered and addressed.  The patient expressed understanding and agrees with the plan.     Diego Irby MD    Scribe Attestation  By signing my name below, I, Hamzah Buckley   attest that this " documentation has been prepared under the direction and in the presence of Diego Irby MD.

## 2024-11-19 ENCOUNTER — ANTICOAGULATION - WARFARIN VISIT (OUTPATIENT)
Dept: PRIMARY CARE | Facility: CLINIC | Age: 74
End: 2024-11-19
Payer: MEDICARE

## 2024-11-19 LAB
INR IN PPP BY COAGULATION ASSAY EXTERNAL: 2.4
INR IN PPP BY COAGULATION ASSAY EXTERNAL: 2.4
PROTHROMBIN TIME (PT) IN PPP BY COAGULATION ASSAY EXTERNAL: NORMAL
PROTHROMBIN TIME (PT) IN PPP BY COAGULATION ASSAY EXTERNAL: NORMAL

## 2024-11-26 ENCOUNTER — ANTICOAGULATION - WARFARIN VISIT (OUTPATIENT)
Dept: PRIMARY CARE | Facility: CLINIC | Age: 74
End: 2024-11-26
Payer: MEDICARE

## 2024-12-01 DIAGNOSIS — F41.9 ANXIETY: ICD-10-CM

## 2024-12-02 RX ORDER — ESCITALOPRAM OXALATE 10 MG/1
10 TABLET ORAL NIGHTLY
Qty: 90 TABLET | Refills: 0 | Status: SHIPPED | OUTPATIENT
Start: 2024-12-02 | End: 2025-03-02

## 2024-12-03 ENCOUNTER — ANTICOAGULATION - WARFARIN VISIT (OUTPATIENT)
Dept: PRIMARY CARE | Facility: CLINIC | Age: 74
End: 2024-12-03
Payer: MEDICARE

## 2024-12-03 LAB
INR IN PPP BY COAGULATION ASSAY EXTERNAL: 3
PROTHROMBIN TIME (PT) IN PPP BY COAGULATION ASSAY EXTERNAL: NORMAL

## 2024-12-10 ENCOUNTER — ANTICOAGULATION - WARFARIN VISIT (OUTPATIENT)
Dept: PRIMARY CARE | Facility: CLINIC | Age: 74
End: 2024-12-10
Payer: MEDICARE

## 2024-12-10 LAB
INR IN PPP BY COAGULATION ASSAY EXTERNAL: 2
PROTHROMBIN TIME (PT) IN PPP BY COAGULATION ASSAY EXTERNAL: NORMAL

## 2024-12-18 ENCOUNTER — ANTICOAGULATION - WARFARIN VISIT (OUTPATIENT)
Dept: PRIMARY CARE | Facility: CLINIC | Age: 74
End: 2024-12-18
Payer: MEDICARE

## 2024-12-24 ENCOUNTER — ANTICOAGULATION - WARFARIN VISIT (OUTPATIENT)
Dept: PRIMARY CARE | Facility: CLINIC | Age: 74
End: 2024-12-24
Payer: MEDICARE

## 2025-01-03 ENCOUNTER — ANTICOAGULATION - WARFARIN VISIT (OUTPATIENT)
Dept: PRIMARY CARE | Facility: CLINIC | Age: 75
End: 2025-01-03
Payer: MEDICARE

## 2025-01-03 LAB
INR IN PPP BY COAGULATION ASSAY EXTERNAL: 2.3
PROTHROMBIN TIME (PT) IN PPP BY COAGULATION ASSAY EXTERNAL: NORMAL

## 2025-01-07 ENCOUNTER — ANTICOAGULATION - WARFARIN VISIT (OUTPATIENT)
Dept: PRIMARY CARE | Facility: CLINIC | Age: 75
End: 2025-01-07
Payer: MEDICARE

## 2025-01-13 ENCOUNTER — APPOINTMENT (OUTPATIENT)
Dept: UROLOGY | Facility: CLINIC | Age: 75
End: 2025-01-13
Payer: MEDICARE

## 2025-01-13 DIAGNOSIS — N32.81 OAB (OVERACTIVE BLADDER): ICD-10-CM

## 2025-01-13 LAB
POC APPEARANCE, URINE: CLEAR
POC BILIRUBIN, URINE: NEGATIVE
POC BLOOD, URINE: ABNORMAL
POC COLOR, URINE: YELLOW
POC GLUCOSE, URINE: NEGATIVE MG/DL
POC KETONES, URINE: NEGATIVE MG/DL
POC LEUKOCYTES, URINE: NEGATIVE
POC NITRITE,URINE: NEGATIVE
POC PH, URINE: 5.5 PH
POC PROTEIN, URINE: NEGATIVE MG/DL
POC SPECIFIC GRAVITY, URINE: 1.02
POC UROBILINOGEN, URINE: 0.2 EU/DL

## 2025-01-13 PROCEDURE — 52287 CYSTOSCOPY CHEMODENERVATION: CPT | Performed by: STUDENT IN AN ORGANIZED HEALTH CARE EDUCATION/TRAINING PROGRAM

## 2025-01-13 RX ORDER — CEPHALEXIN 500 MG/1
500 CAPSULE ORAL 2 TIMES DAILY
Qty: 6 CAPSULE | Refills: 0 | Status: SHIPPED | OUTPATIENT
Start: 2025-01-13 | End: 2025-01-16

## 2025-01-13 RX ORDER — SODIUM BICARBONATE 1 MEQ/ML
10 SYRINGE (ML) INTRAVENOUS ONCE
Status: COMPLETED | OUTPATIENT
Start: 2025-01-13 | End: 2025-01-13

## 2025-01-13 RX ORDER — NITROFURANTOIN 25; 75 MG/1; MG/1
100 CAPSULE ORAL 2 TIMES DAILY
Qty: 6 CAPSULE | Refills: 0 | Status: SHIPPED | OUTPATIENT
Start: 2025-01-13 | End: 2025-01-16

## 2025-01-13 RX ORDER — LIDOCAINE HYDROCHLORIDE 10 MG/ML
50 INJECTION, SOLUTION INFILTRATION; PERINEURAL ONCE
Status: COMPLETED | OUTPATIENT
Start: 2025-01-13 | End: 2025-01-13

## 2025-01-13 RX ADMIN — Medication 10 MEQ: at 11:41

## 2025-01-13 RX ADMIN — LIDOCAINE HYDROCHLORIDE 50 ML: 10 INJECTION, SOLUTION INFILTRATION; PERINEURAL at 11:40

## 2025-01-13 NOTE — PROGRESS NOTES
HISTORY OF PRESENT ILLNESS:  Mariposa Comer is a 74 y.o. female who presents today for a botox injection.          Past Medical History  She has a past medical history of Acute upper respiratory infection (07/24/2023), Asymptomatic menopausal state, Encounter for full-term uncomplicated delivery, Malignant neoplasm of connective and soft tissue of left lower limb, including hip (Multi), Menopausal and female climacteric states, Nausea, Old myocardial infarction, Personal history of other benign neoplasm, Personal history of other diseases of the digestive system, Personal history of other diseases of the respiratory system, Personal history of pneumonia (recurrent), Sore throat (03/22/2023), Unspecified ovarian cyst, unspecified side, and Vaginal delivery ([facility]) (09/04/2024).    Surgical History  She has a past surgical history that includes Cholecystectomy (07/22/2014); Tonsillectomy (07/22/2014); Tubal ligation (07/22/2014); Colonoscopy (07/22/2014); Other surgical history (07/22/2014); Other surgical history (08/20/2021); Other surgical history (05/13/2020); Other surgical history (04/07/2020); Other surgical history (04/07/2020); Other surgical history (04/07/2020); and Other surgical history (01/06/2020).     Social History  She reports that she quit smoking about 54 years ago. Her smoking use included cigarettes. She has never used smokeless tobacco. She reports that she does not drink alcohol and does not use drugs.    Family History  Family History   Problem Relation Name Age of Onset    Hyperlipidemia Mother      Hypertension Mother      Other (Cardiac disorder) Mother      Heart attack Mother      Raynaud syndrome Mother      Sjogren's syndrome Mother      Heart attack Father      Other (Other) Father          Allergies  Omeprazole, Tramadol, and Bactrim [sulfamethoxazole-trimethoprim]      A comprehensive 10+ review of systems was negative except for: see hpi                          PHYSICAL  "EXAMINATION:  BP Readings from Last 3 Encounters:   09/05/24 121/66   08/01/24 116/68   05/02/24 122/69      Wt Readings from Last 3 Encounters:   10/28/24 77.1 kg (170 lb)   09/05/24 78.2 kg (172 lb 8 oz)   08/01/24 77.8 kg (171 lb 9.6 oz)      BMI:   Estimated body mass index is 29.18 kg/m² as calculated from the following:    Height as of 10/28/24: 1.626 m (5' 4\").    Weight as of 10/28/24: 77.1 kg (170 lb).  BSA:   Estimated body surface area is 1.87 meters squared as calculated from the following:    Height as of 10/28/24: 1.626 m (5' 4\").    Weight as of 10/28/24: 77.1 kg (170 lb).  HEENT: Normocephalic, atraumatic, PER EOMI, nonicteric, trachea normal, thyroid normal, oropharynx normal.  CARDIAC: regular rate & rhythm, S1 & S2 normal.  No heaves, thrills, gallops or murmurs.  LUNGS: Clear to auscultation, no spinal or CV tenderness.  EXTREMITIES: No evidence of cyanosis, clubbing or edema.       Botox Injection    Mariposa came today for Botox injection.  I reviewed with her the risks and benefits including ptosis, infection, and bleeding. She has no contraindications. She is on no antibiotics and has no neuromuscular disorders.  Pregnancy is not an issue.     She tolerated the procedure well.  The patient  will return to see me again in three to four months, earlier if there are any problems.     Mariposa understands the side effects and risks, as well as the necessity for continued treatment to maintain improvement.  Additional therapy may be necessary. Call if there are any problems. See diagram for injection sites and dosages. 100 units were used at a concentration of 10 units per 0.1 mL.         Assessment:  74-year-old with OAB, urge incontinence, and fecal incontinence with constipation and genitourinary syndrome of menopause     GUSM:  -Continue using estradiol cream        Shaye:  -Rx methenamine   -Standing urine culture         FI:   -doing well with this Metamucil  -Discussed options for sacral " neuromodulation        OAB/UUI  -Drink no more than 8 oz. of liiquid per hour  -Myrbetriq not covered by insurance, wants to proceed with botox  -Plan on UDS   -S/P botox, 100 units 3/28/24, 1/13/2025            All questions and concerns were answered and addressed.  The patient expressed understanding and agrees with the plan.     Diego Irby MD    Scribe Attestation   By signing my name below, I, Cait Jeff, Scribe attestation that this documentation has been prepared under the direction and in the presence of Diego Irby MD.

## 2025-01-14 ENCOUNTER — ANTICOAGULATION - WARFARIN VISIT (OUTPATIENT)
Dept: PRIMARY CARE | Facility: CLINIC | Age: 75
End: 2025-01-14
Payer: MEDICARE

## 2025-01-21 ENCOUNTER — ANTICOAGULATION - WARFARIN VISIT (OUTPATIENT)
Dept: PRIMARY CARE | Facility: CLINIC | Age: 75
End: 2025-01-21
Payer: MEDICARE

## 2025-01-29 ENCOUNTER — ANTICOAGULATION - WARFARIN VISIT (OUTPATIENT)
Dept: PRIMARY CARE | Facility: CLINIC | Age: 75
End: 2025-01-29
Payer: MEDICARE

## 2025-02-04 ENCOUNTER — ANTICOAGULATION - WARFARIN VISIT (OUTPATIENT)
Dept: PRIMARY CARE | Facility: CLINIC | Age: 75
End: 2025-02-04
Payer: MEDICARE

## 2025-02-04 LAB
INR IN PPP BY COAGULATION ASSAY EXTERNAL: 3.1
PROTHROMBIN TIME (PT) IN PPP BY COAGULATION ASSAY EXTERNAL: NORMAL

## 2025-02-11 ENCOUNTER — ANTICOAGULATION - WARFARIN VISIT (OUTPATIENT)
Dept: PRIMARY CARE | Facility: CLINIC | Age: 75
End: 2025-02-11
Payer: MEDICARE

## 2025-02-13 ENCOUNTER — TELEPHONE (OUTPATIENT)
Dept: PRIMARY CARE | Facility: CLINIC | Age: 75
End: 2025-02-13
Payer: MEDICARE

## 2025-02-13 DIAGNOSIS — I48.91 ATRIAL FIBRILLATION, UNSPECIFIED TYPE (MULTI): ICD-10-CM

## 2025-02-13 DIAGNOSIS — I48.0 PAROXYSMAL ATRIAL FIBRILLATION (MULTI): ICD-10-CM

## 2025-02-13 NOTE — TELEPHONE ENCOUNTER
MEDICATION REFILL    Pharmacy Name:   [facility]  Medication requested            Jantovin    6 mg    1/2 tablet daily           Jantovin    1 mg    2 daily  Dosage [see above]  Quantity    90 days   Allergies    none given  Date of last visit   09/05/2024  Date of Next Appointment   06/24/2025     General

## 2025-02-17 RX ORDER — WARFARIN 6 MG/1
TABLET ORAL
Qty: 45 TABLET | Refills: 2 | Status: SHIPPED | OUTPATIENT
Start: 2025-02-17

## 2025-02-17 RX ORDER — WARFARIN 1 MG/1
TABLET ORAL
Qty: 180 TABLET | Refills: 1 | Status: SHIPPED | OUTPATIENT
Start: 2025-02-17

## 2025-02-24 ENCOUNTER — APPOINTMENT (OUTPATIENT)
Dept: UROLOGY | Facility: CLINIC | Age: 75
End: 2025-02-24
Payer: MEDICARE

## 2025-02-24 DIAGNOSIS — R15.9 INCONTINENCE OF FECES, UNSPECIFIED FECAL INCONTINENCE TYPE: Primary | ICD-10-CM

## 2025-02-24 DIAGNOSIS — N32.81 OAB (OVERACTIVE BLADDER): ICD-10-CM

## 2025-02-24 DIAGNOSIS — R30.0 DYSURIA: ICD-10-CM

## 2025-02-24 PROCEDURE — 99213 OFFICE O/P EST LOW 20 MIN: CPT | Performed by: STUDENT IN AN ORGANIZED HEALTH CARE EDUCATION/TRAINING PROGRAM

## 2025-02-24 NOTE — PROGRESS NOTES
HISTORY OF PRESENT ILLNESS:  Mariposa Comer is a 74 y.o. female who presents today for a follow up visit. She has had improvement in her symptoms since her botox injections.           Past Medical History  She has a past medical history of Acute upper respiratory infection (07/24/2023), Asymptomatic menopausal state, Encounter for full-term uncomplicated delivery, Malignant neoplasm of connective and soft tissue of left lower limb, including hip (Multi), Menopausal and female climacteric states, Nausea, Old myocardial infarction, Personal history of other benign neoplasm, Personal history of other diseases of the digestive system, Personal history of other diseases of the respiratory system, Personal history of pneumonia (recurrent), Sore throat (03/22/2023), Unspecified ovarian cyst, unspecified side, and Vaginal delivery ([facility]) (09/04/2024).    Surgical History  She has a past surgical history that includes Cholecystectomy (07/22/2014); Tonsillectomy (07/22/2014); Tubal ligation (07/22/2014); Colonoscopy (07/22/2014); Other surgical history (07/22/2014); Other surgical history (08/20/2021); Other surgical history (05/13/2020); Other surgical history (04/07/2020); Other surgical history (04/07/2020); Other surgical history (04/07/2020); and Other surgical history (01/06/2020).     Social History  She reports that she quit smoking about 54 years ago. Her smoking use included cigarettes. She has never used smokeless tobacco. She reports that she does not drink alcohol and does not use drugs.    Family History  Family History   Problem Relation Name Age of Onset    Hyperlipidemia Mother      Hypertension Mother      Other (Cardiac disorder) Mother      Heart attack Mother      Raynaud syndrome Mother      Sjogren's syndrome Mother      Heart attack Father      Other (Other) Father          Allergies  Nitrofurantoin macrocrystal, Omeprazole, Tramadol, and Bactrim [sulfamethoxazole-trimethoprim]      A comprehensive  "10+ review of systems was negative except for: see hpi                          PHYSICAL EXAMINATION:  BP Readings from Last 3 Encounters:   09/05/24 121/66   08/01/24 116/68   05/02/24 122/69      Wt Readings from Last 3 Encounters:   10/28/24 77.1 kg (170 lb)   09/05/24 78.2 kg (172 lb 8 oz)   08/01/24 77.8 kg (171 lb 9.6 oz)      BMI: Estimated body mass index is 29.18 kg/m² as calculated from the following:    Height as of 10/28/24: 1.626 m (5' 4\").    Weight as of 10/28/24: 77.1 kg (170 lb).  BSA: Estimated body surface area is 1.87 meters squared as calculated from the following:    Height as of 10/28/24: 1.626 m (5' 4\").    Weight as of 10/28/24: 77.1 kg (170 lb).  HEENT: Normocephalic, atraumatic, PER EOMI, nonicteric, trachea normal, thyroid normal, oropharynx normal.  CARDIAC: regular rate & rhythm, S1 & S2 normal.  No heaves, thrills, gallops or murmurs.  LUNGS: Clear to auscultation, no spinal or CV tenderness.  EXTREMITIES: No evidence of cyanosis, clubbing or edema.               Assessment:  74-year-old with OAB, urge incontinence, and fecal incontinence with constipation and genitourinary syndrome of menopause     GUSM:  -Continue using estradiol cream        Shaye:  -Rx methenamine   -Standing urine culture         FI:   -doing well with this Metamucil  -Discussed options for sacral neuromodulation        OAB/UUI:  -Drink no more than 8 oz. of liiquid per hour  -Myrbetriq not covered by insurance, wants to proceed with botox  -S/P botox, 100 units 3/28/24, 1/13/2025, 80-90% improved       Follow up in 3 mo        All questions and concerns were answered and addressed.  The patient expressed understanding and agrees with the plan.     Diego Irby MD    Scribe Attestation  By signing my name below, IAnia, Scribe   attest that this documentation has been prepared under the direction and in the presence of Diego Irby MD.  "

## 2025-02-25 ENCOUNTER — ANTICOAGULATION - WARFARIN VISIT (OUTPATIENT)
Dept: PRIMARY CARE | Facility: CLINIC | Age: 75
End: 2025-02-25
Payer: MEDICARE

## 2025-02-25 NOTE — PROGRESS NOTES
Pt informed to continue same dose recheck in  1 wk. FYI pt will be out od town next Tues will not be checking INR,

## 2025-03-04 ENCOUNTER — ANTICOAGULATION - WARFARIN VISIT (OUTPATIENT)
Dept: PRIMARY CARE | Facility: CLINIC | Age: 75
End: 2025-03-04
Payer: MEDICARE

## 2025-03-04 NOTE — PROGRESS NOTES
Pt informed to continue same dose recheck in 1 wk. Pt states she will be out of town next week so will not be checking inr

## 2025-03-18 ENCOUNTER — ANTICOAGULATION - WARFARIN VISIT (OUTPATIENT)
Dept: PRIMARY CARE | Facility: CLINIC | Age: 75
End: 2025-03-18
Payer: MEDICARE

## 2025-03-26 ENCOUNTER — ANTICOAGULATION - WARFARIN VISIT (OUTPATIENT)
Dept: PRIMARY CARE | Facility: CLINIC | Age: 75
End: 2025-03-26
Payer: MEDICARE

## 2025-04-01 ENCOUNTER — APPOINTMENT (OUTPATIENT)
Dept: CARDIOLOGY | Facility: HOSPITAL | Age: 75
End: 2025-04-01
Payer: MEDICARE

## 2025-04-01 ENCOUNTER — ANTICOAGULATION - WARFARIN VISIT (OUTPATIENT)
Dept: PRIMARY CARE | Facility: CLINIC | Age: 75
End: 2025-04-01
Payer: MEDICARE

## 2025-04-08 ENCOUNTER — ANTICOAGULATION - WARFARIN VISIT (OUTPATIENT)
Dept: PRIMARY CARE | Facility: CLINIC | Age: 75
End: 2025-04-08
Payer: MEDICARE

## 2025-04-10 ENCOUNTER — APPOINTMENT (OUTPATIENT)
Dept: CARDIOLOGY | Facility: HOSPITAL | Age: 75
End: 2025-04-10
Payer: MEDICARE

## 2025-04-16 ENCOUNTER — ANTICOAGULATION - WARFARIN VISIT (OUTPATIENT)
Dept: PRIMARY CARE | Facility: CLINIC | Age: 75
End: 2025-04-16
Payer: MEDICARE

## 2025-04-22 ENCOUNTER — ANTICOAGULATION - WARFARIN VISIT (OUTPATIENT)
Dept: PRIMARY CARE | Facility: CLINIC | Age: 75
End: 2025-04-22
Payer: MEDICARE

## 2025-04-29 ENCOUNTER — ANTICOAGULATION - WARFARIN VISIT (OUTPATIENT)
Dept: PRIMARY CARE | Facility: CLINIC | Age: 75
End: 2025-04-29
Payer: MEDICARE

## 2025-05-01 ENCOUNTER — OFFICE VISIT (OUTPATIENT)
Dept: CARDIOLOGY | Facility: HOSPITAL | Age: 75
End: 2025-05-01
Payer: MEDICARE

## 2025-05-01 VITALS
DIASTOLIC BLOOD PRESSURE: 69 MMHG | BODY MASS INDEX: 30.39 KG/M2 | HEART RATE: 60 BPM | WEIGHT: 177.03 LBS | SYSTOLIC BLOOD PRESSURE: 138 MMHG | OXYGEN SATURATION: 95 %

## 2025-05-01 DIAGNOSIS — I48.0 PAROXYSMAL ATRIAL FIBRILLATION (MULTI): Primary | ICD-10-CM

## 2025-05-01 DIAGNOSIS — E78.5 DYSLIPIDEMIA: ICD-10-CM

## 2025-05-01 LAB
ATRIAL RATE: 59 BPM
P AXIS: 82 DEGREES
P OFFSET: 147 MS
P ONSET: 119 MS
PR INTERVAL: 192 MS
Q ONSET: 215 MS
QRS COUNT: 10 BEATS
QRS DURATION: 86 MS
QT INTERVAL: 418 MS
QTC CALCULATION(BAZETT): 413 MS
QTC FREDERICIA: 415 MS
R AXIS: -12 DEGREES
T AXIS: 124 DEGREES
T OFFSET: 424 MS
VENTRICULAR RATE: 59 BPM

## 2025-05-01 PROCEDURE — 99213 OFFICE O/P EST LOW 20 MIN: CPT | Performed by: INTERNAL MEDICINE

## 2025-05-01 PROCEDURE — 93005 ELECTROCARDIOGRAM TRACING: CPT | Performed by: INTERNAL MEDICINE

## 2025-05-01 PROCEDURE — G2211 COMPLEX E/M VISIT ADD ON: HCPCS | Performed by: INTERNAL MEDICINE

## 2025-05-01 PROCEDURE — 1159F MED LIST DOCD IN RCRD: CPT | Performed by: INTERNAL MEDICINE

## 2025-05-01 NOTE — PROGRESS NOTES
Chief Complaint:   No chief complaint on file.     History Of Present Illness:    Mariposa Comer is a 74 y.o. female presenting with ***.     Last Recorded Vitals:  Vitals:    05/01/25 1014   BP: 138/69   Pulse: 60   SpO2: 95%   Weight: 80.3 kg (177 lb 0.5 oz)       Past Medical History:  She has a past medical history of Acute upper respiratory infection (07/24/2023), Asymptomatic menopausal state, Encounter for full-term uncomplicated delivery, Malignant neoplasm of connective and soft tissue of left lower limb, including hip, Menopausal and female climacteric states, Nausea, Old myocardial infarction, Personal history of other benign neoplasm, Personal history of other diseases of the digestive system, Personal history of other diseases of the respiratory system, Personal history of pneumonia (recurrent), Sore throat (03/22/2023), Unspecified ovarian cyst, unspecified side, and Vaginal delivery ([facility]) (09/04/2024).    Past Surgical History:  She has a past surgical history that includes Cholecystectomy (07/22/2014); Tonsillectomy (07/22/2014); Tubal ligation (07/22/2014); Colonoscopy (07/22/2014); Other surgical history (07/22/2014); Other surgical history (08/20/2021); Other surgical history (05/13/2020); Other surgical history (04/07/2020); Other surgical history (04/07/2020); Other surgical history (04/07/2020); and Other surgical history (01/06/2020).      Social History:  She reports that she quit smoking about 54 years ago. Her smoking use included cigarettes. She has never used smokeless tobacco. She reports that she does not drink alcohol and does not use drugs.    Family History:  Family History[1]     Allergies:  Nitrofurantoin macrocrystal, Omeprazole, Tramadol, and Bactrim [sulfamethoxazole-trimethoprim]    Outpatient Medications:  Current Outpatient Medications   Medication Instructions    albuterol 90 mcg/actuation inhaler 2 puffs, Every 4 hours PRN    calcium carbonate-vitamin D3 1,000 mg-20 mcg  (800 unit) tablet 1 Capful    coenzyme Q10 400 mg, Daily with breakfast    escitalopram (LEXAPRO) 10 mg, oral, Nightly    fluticasone (Flonase) 50 mcg/actuation nasal spray 2 sprays, Each Nostril, Daily    gabapentin (NEURONTIN) 200 mg, oral, Nightly, TAKE 1 CAPSULE in the morning, 1 capsule at noon and 2 capsules at bedtime    hydrocortisone 2.5 % cream Topical, 2 times daily PRN    hydrOXYzine HCL (ATARAX) 10-20 mg, oral, Every 8 hours PRN    levocetirizine (XYZAL) 5 mg    lovastatin (MEVACOR) 40 mg, oral, Nightly    methenamine hippurate (HIPREX) 1 g, oral, 2 times daily    mv,calcium,min/iron/folic/vitK (MULTI FOR HER ORAL) Take by mouth.    pantoprazole (PROTONIX) 40 mg, oral, Daily before breakfast, TAKE 1 TABLET Daily 30 minutes before breakfast    prothrombin time/INR test metr misc Use to test your INR 1-2x weekly, or as instructed.    psyllium (Metamucil, with sugar,) 3.4 gram packet 1 packet, Daily    warfarin (Coumadin) 1 mg tablet Take 2 tablets daily. Total daily dose 5 mg. Or as directed.    warfarin (Coumadin) 6 mg tablet Take half tablet once daily. Total daily dose 5 mg.       Physical Exam:  ***     Last Labs:  CBC -  Lab Results   Component Value Date    WBC 5.0 07/25/2023    HGB 14.0 07/25/2023    HCT 45.1 07/25/2023    MCV 94 07/25/2023     07/25/2023       CMP -  Lab Results   Component Value Date    CALCIUM 9.3 09/05/2024    PROT 6.5 09/05/2024    ALBUMIN 4.4 09/05/2024    AST 21 09/05/2024    ALT 19 09/05/2024    ALKPHOS 82 09/05/2024    BILITOT 0.6 09/05/2024       LIPID PANEL -   Lab Results   Component Value Date    CHOL 172 07/25/2023    TRIG 103 07/25/2023    HDL 62.6 07/25/2023    CHHDL 2.7 07/25/2023    LDLF 89 07/25/2023    VLDL 21 07/25/2023       RENAL FUNCTION PANEL -   Lab Results   Component Value Date    GLUCOSE 81 09/05/2024     09/05/2024    K 5.0 09/05/2024     09/05/2024    CO2 32 09/05/2024    ANIONGAP 11 09/05/2024    BUN 21 09/05/2024    CREATININE  "0.74 09/05/2024    CALCIUM 9.3 09/05/2024    ALBUMIN 4.4 09/05/2024        No results found for: \"BNP\", \"HGBA1C\"    Last Cardiology Tests:  ECG independently reviewed from April 2: Sinus rhythm, 1 PAC, rate 61     Echo 6/23/23:  1. Left ventricular systolic function is low normal with a 50-55% estimated ejection fraction.  2. Spectral Doppler shows a pseudonormal pattern of left ventricular diastolic filling.  3. RVSP within normal limits.    Assessment/Plan   Very pleasant 73 year-old female with remote MI (? stress cardiomyopathy and she reports clean cath 2006), DLD and atrial fibrillation s/p RFA,.  Overall doing well from a cardiac standpoint.  Blood pressure well-controlled without antihypertensive therapy, lipids well-controlled.  Plan to continue current warfarin and lovastatin, she follows up with EP, we will see her again in 1 year or sooner if needed       Ciaran Garrido MD       [1]   Family History  Problem Relation Name Age of Onset    Hyperlipidemia Mother      Hypertension Mother      Other (Cardiac disorder) Mother      Heart attack Mother      Raynaud syndrome Mother      Sjogren's syndrome Mother      Heart attack Father      Other (Other) Father       " Mother      Raynaud syndrome Mother      Sjogren's syndrome Mother      Heart attack Father      Other (Other) Father

## 2025-05-08 ENCOUNTER — OFFICE VISIT (OUTPATIENT)
Dept: CARDIOLOGY | Facility: HOSPITAL | Age: 75
End: 2025-05-08
Payer: MEDICARE

## 2025-05-08 VITALS
DIASTOLIC BLOOD PRESSURE: 69 MMHG | SYSTOLIC BLOOD PRESSURE: 125 MMHG | RESPIRATION RATE: 18 BRPM | BODY MASS INDEX: 30.49 KG/M2 | HEIGHT: 64 IN | OXYGEN SATURATION: 96 % | HEART RATE: 56 BPM | WEIGHT: 178.57 LBS

## 2025-05-08 DIAGNOSIS — I48.0 PAROXYSMAL ATRIAL FIBRILLATION (MULTI): Primary | ICD-10-CM

## 2025-05-08 PROCEDURE — 1160F RVW MEDS BY RX/DR IN RCRD: CPT | Performed by: NURSE PRACTITIONER

## 2025-05-08 PROCEDURE — 99214 OFFICE O/P EST MOD 30 MIN: CPT | Performed by: NURSE PRACTITIONER

## 2025-05-08 PROCEDURE — 3008F BODY MASS INDEX DOCD: CPT | Performed by: NURSE PRACTITIONER

## 2025-05-08 PROCEDURE — 1159F MED LIST DOCD IN RCRD: CPT | Performed by: NURSE PRACTITIONER

## 2025-05-08 PROCEDURE — 1036F TOBACCO NON-USER: CPT | Performed by: NURSE PRACTITIONER

## 2025-05-08 NOTE — PROGRESS NOTES
"Subjective   Mariposa Comer is a 74 y.o. female.    Chief Complaint:  Atrial Fibrillation    Mariposa Comer is a 74 y.o. year old female patient who presents for annual follow of Afib.     S/p AF RFA August 2020. She had a very high scar burden noted during RFA. PVI+Roof+anterior lines performed. Started on amiodarone for blanking period which was discontinued February 2021.     Echo 6/14/2023: 1. Left ventricular systolic function is low normal with a 50-55% estimated ejection fraction. 2. Spectral Doppler shows a pseudonormal pattern of left ventricular diastolic filling. 3. RVSP within normal limits.    ECG 5/1/2025 SB with SA HR 59 bpm    Patient presents today for annual follow up of Afib. She denies any recurrent arrhythmia symptoms, she is still on Warfarin without any bleeding issues.        /69 (BP Location: Left arm)   Pulse 56   Resp 18   Ht 1.626 m (5' 4\")   Wt 81 kg (178 lb 9.2 oz)   SpO2 96%   BMI 30.65 kg/m²   Medications Ordered Prior to Encounter[1]      Review of Systems   Constitutional: Negative for diaphoresis, fever and malaise/fatigue.   HENT:  Negative for congestion and sore throat.    Eyes:  Negative for blurred vision and double vision.   Cardiovascular:  Negative for chest pain, dyspnea on exertion, irregular heartbeat, leg swelling, near-syncope, orthopnea, palpitations, paroxysmal nocturnal dyspnea and syncope.   Respiratory:  Negative for cough, hemoptysis, shortness of breath, snoring and sputum production.    Hematologic/Lymphatic: Negative for bleeding problem.   Skin:  Negative for rash.   Musculoskeletal:  Negative for falls, joint pain and myalgias.   Gastrointestinal:  Negative for abdominal pain, diarrhea, nausea and vomiting.   Neurological:  Negative for dizziness, headaches, light-headedness and weakness.   All other systems reviewed and are negative.      Objective   Constitutional:       Appearance: Healthy appearance. Not in distress.   Eyes:      " Conjunctiva/sclera: Conjunctivae normal.      Pupils: Pupils are equal, round, and reactive to light.   HENT:    Mouth/Throat:      Pharynx: Oropharynx is clear.   Pulmonary:      Effort: Pulmonary effort is normal.      Breath sounds: Normal breath sounds. No wheezing. No rhonchi. No rales.   Cardiovascular:      PMI at left midclavicular line. Normal rate. Regular rhythm.      Murmurs: There is no murmur.      No gallop.    Pulses:     Intact distal pulses.   Edema:     Peripheral edema absent.   Abdominal:      General: Bowel sounds are normal.      Palpations: Abdomen is soft.      Tenderness: There is no abdominal tenderness.   Musculoskeletal: Normal range of motion.         General: No tenderness. Skin:     General: Skin is warm and dry.   Neurological:      General: No focal deficit present.      Mental Status: Alert and oriented to person, place and time.         Lab Review:   Office Visit on 05/01/2025   Component Date Value    Ventricular Rate 05/01/2025 59     Atrial Rate 05/01/2025 59     TX Interval 05/01/2025 192     QRS Duration 05/01/2025 86     QT Interval 05/01/2025 418     QTC Calculation(Bazett) 05/01/2025 413     P Axis 05/01/2025 82     R Axis 05/01/2025 -12     T [place] 05/01/2025 124     QRS Count 05/01/2025 10     Q Onset 05/01/2025 215     P Onset 05/01/2025 119     P Offset 05/01/2025 147     T Offset 05/01/2025 424     QTC [place] 05/01/2025 415    Anticoagulation - Warfarin Visit on 04/29/2025   Component Date Value    INR External 04/29/2025 3.00    Anticoagulation - Warfarin Visit on 04/22/2025   Component Date Value    INR External 04/22/2025 2.60    Anticoagulation - Warfarin Visit on 04/16/2025   Component Date Value    INR External 04/15/2025 2.60    Anticoagulation - Warfarin Visit on 04/08/2025   Component Date Value    INR External 04/08/2025 2.60    Anticoagulation - Warfarin Visit on 04/01/2025   Component Date Value    INR External 04/01/2025 2.70    Anticoagulation -  Warfarin Visit on 03/26/2025   Component Date Value    INR External 03/25/2025 2.50    Anticoagulation - Warfarin Visit on 03/18/2025   Component Date Value    INR External 03/18/2025 2.50        Assessment/Plan   The encounter diagnosis was Paroxysmal atrial fibrillation (Multi).  Patient is now almost 5 years post Afib ablation without any Afib recurrence.  Her CHADSVASC score is 3, we will continue Warfarin indefinitely  Follow up with general cardiology as scheduled  Patient can follow up with EP as needed       [1]   Current Outpatient Medications on File Prior to Visit   Medication Sig Dispense Refill    albuterol 90 mcg/actuation inhaler Inhale 2 puffs every 4 hours if needed for wheezing. USE 2 INHALATIONS ONE MINUTE APART EVERY 4 OR MORE HOURS AS NEEDED FOR WHEEZE      calcium carbonate-vitamin D3 1,000 mg-20 mcg (800 unit) tablet Take 1 Capful by mouth.      coenzyme Q10 400 mg capsule Take 1 capsule (400 mg) by mouth once daily with breakfast.      escitalopram (Lexapro) 10 mg tablet Take 1 tablet (10 mg) by mouth once daily at bedtime. (Patient taking differently: Take 1 tablet (10 mg) by mouth once daily at bedtime. Pt states she cuts in half to 5mg) 90 tablet 0    fluticasone (Flonase) 50 mcg/actuation nasal spray Administer 2 sprays into each nostril once daily. 48 g 3    gabapentin (Neurontin) 100 mg capsule Take 2 capsules (200 mg) by mouth once daily at bedtime. TAKE 1 CAPSULE in the morning, 1 capsule at noon and 2 capsules at bedtime 200 capsule 1    hydrocortisone 2.5 % cream Apply topically 2 times a day as needed for irritation or rash. 30 g 2    levocetirizine (Xyzal) 5 mg tablet Take 1 tablet (5 mg) by mouth.      lovastatin (Mevacor) 40 mg tablet Take 1 tablet (40 mg) by mouth once daily at bedtime. 100 tablet 1    methenamine hippurate (Hiprex) 1 gram tablet Take 1 tablet (1 g) by mouth 2 times a day. 180 tablet 3    mv,calcium,min/iron/folic/vitK (MULTI FOR HER ORAL) Take by mouth.       pantoprazole (ProtoNix) 40 mg EC tablet Take 1 tablet (40 mg) by mouth once daily in the morning. Take before meals. TAKE 1 TABLET Daily 30 minutes before breakfast 100 tablet 1    prothrombin time/INR test metr misc Use to test your INR 1-2x weekly, or as instructed. 1 each 0    psyllium (Metamucil, with sugar,) 3.4 gram packet Take 1 packet by mouth early in the morning.. use 1 packet daily, if still having loose stool after 1 week, go up to 2 packets daily      warfarin (Coumadin) 1 mg tablet Take 2 tablets daily. Total daily dose 5 mg. Or as directed. 180 tablet 1    warfarin (Coumadin) 6 mg tablet Take half tablet once daily. Total daily dose 5 mg. 45 tablet 2    [DISCONTINUED] hydrOXYzine HCL (Atarax) 10 mg tablet Take 1-2 tablets (10-20 mg) by mouth every 8 hours if needed for anxiety. (Patient not taking: Reported on 5/1/2025) 60 tablet 0     No current facility-administered medications on file prior to visit.

## 2025-05-13 ENCOUNTER — ANTICOAGULATION - WARFARIN VISIT (OUTPATIENT)
Dept: PRIMARY CARE | Facility: CLINIC | Age: 75
End: 2025-05-13
Payer: MEDICARE

## 2025-05-14 ASSESSMENT — ENCOUNTER SYMPTOMS
SPUTUM PRODUCTION: 0
WEAKNESS: 0
FEVER: 0
PND: 0
ABDOMINAL PAIN: 0
FALLS: 0
ORTHOPNEA: 0
NEAR-SYNCOPE: 0
SORE THROAT: 0
SNORING: 0
SYNCOPE: 0
NAUSEA: 0
HEMOPTYSIS: 0
COUGH: 0
PALPITATIONS: 0
HEADACHES: 0
SHORTNESS OF BREATH: 0
IRREGULAR HEARTBEAT: 0
DIARRHEA: 0
DIZZINESS: 0
VOMITING: 0
LIGHT-HEADEDNESS: 0
DOUBLE VISION: 0
DYSPNEA ON EXERTION: 0
DIAPHORESIS: 0
MYALGIAS: 0
BLURRED VISION: 0

## 2025-05-15 ENCOUNTER — APPOINTMENT (OUTPATIENT)
Dept: UROLOGY | Facility: CLINIC | Age: 75
End: 2025-05-15
Payer: MEDICARE

## 2025-05-15 DIAGNOSIS — R15.9 INCONTINENCE OF FECES, UNSPECIFIED FECAL INCONTINENCE TYPE: ICD-10-CM

## 2025-05-15 DIAGNOSIS — N32.81 OAB (OVERACTIVE BLADDER): Primary | ICD-10-CM

## 2025-05-15 DIAGNOSIS — N39.0 RECURRENT UTI: ICD-10-CM

## 2025-05-15 DIAGNOSIS — N95.8 GENITOURINARY SYNDROME OF MENOPAUSE: ICD-10-CM

## 2025-05-15 PROCEDURE — 99214 OFFICE O/P EST MOD 30 MIN: CPT | Performed by: STUDENT IN AN ORGANIZED HEALTH CARE EDUCATION/TRAINING PROGRAM

## 2025-05-15 PROCEDURE — G2211 COMPLEX E/M VISIT ADD ON: HCPCS | Performed by: STUDENT IN AN ORGANIZED HEALTH CARE EDUCATION/TRAINING PROGRAM

## 2025-05-15 NOTE — PROGRESS NOTES
HISTORY OF PRESENT ILLNESS:  Mariposa Comer is a 74 y.o. female who presents today for a follow up visit. She is still doing well with the botox injection. She has no complaints or concerns at this time.           Past Medical History  She has a past medical history of Acute upper respiratory infection (07/24/2023), Allergic (1965), Arthritis (1980), Asthma (1970), Asymptomatic menopausal state, Encounter for full-term uncomplicated delivery, GERD (gastroesophageal reflux disease) (2006), Malignant neoplasm of connective and soft tissue of left lower limb, including hip, Menopausal and female climacteric states, Nausea, Old myocardial infarction, Personal history of other benign neoplasm, Personal history of other diseases of the digestive system, Personal history of other diseases of the respiratory system, Personal history of pneumonia (recurrent), Sore throat (03/22/2023), Unspecified ovarian cyst, unspecified side, and Vaginal delivery ([facility]) (09/04/2024).    Surgical History  She has a past surgical history that includes Cholecystectomy (07/22/2014); Tonsillectomy (07/22/2014); Tubal ligation (07/22/2014); Colonoscopy (07/22/2014); Other surgical history (07/22/2014); Other surgical history (08/20/2021); Other surgical history (05/13/2020); Other surgical history (04/07/2020); Other surgical history (04/07/2020); Other surgical history (04/07/2020); Other surgical history (01/06/2020); Cardiac catheterization (2006); and [place] tooth extraction (1972).     Social History  She reports that she quit smoking about 54 years ago. Her smoking use included cigarettes. She has never used smokeless tobacco. She reports that she does not drink alcohol and does not use drugs.    Family History  Family History[1]     Allergies  Nitrofurantoin macrocrystal, Omeprazole, Tramadol, and Bactrim [sulfamethoxazole-trimethoprim]      A comprehensive 10+ review of systems was negative except for: see hpi                         "  PHYSICAL EXAMINATION:  BP Readings from Last 3 Encounters:   05/08/25 125/69   05/01/25 138/69   09/05/24 121/66      Wt Readings from Last 3 Encounters:   05/08/25 81 kg (178 lb 9.2 oz)   05/01/25 80.3 kg (177 lb 0.5 oz)   10/28/24 77.1 kg (170 lb)      BMI: Estimated body mass index is 30.65 kg/m² as calculated from the following:    Height as of 5/8/25: 1.626 m (5' 4\").    Weight as of 5/8/25: 81 kg (178 lb 9.2 oz).  BSA: Estimated body surface area is 1.91 meters squared as calculated from the following:    Height as of 5/8/25: 1.626 m (5' 4\").    Weight as of 5/8/25: 81 kg (178 lb 9.2 oz).  HEENT: Normocephalic, atraumatic, PER EOMI, nonicteric, trachea normal, thyroid normal, oropharynx normal.  CARDIAC: regular rate & rhythm, S1 & S2 normal.  No heaves, thrills, gallops or murmurs.  LUNGS: Clear to auscultation, no spinal or CV tenderness.  EXTREMITIES: No evidence of cyanosis, clubbing or edema.               Assessment:  74-year-old with OAB, urge incontinence, and fecal incontinence with constipation and genitourinary syndrome of menopause     GUSM:  -Continue using estradiol cream        Shaye:  -Rx methenamine   -Standing urine culture         FI:   -doing well with this Metamucil  -Discussed options for sacral neuromodulation        OAB/UUI:  -Drink no more than 8 oz. of liiquid per hour  -Myrbetriq not covered by insurance, wants to proceed with botox  -S/P botox, 100 units 3/28/24, 1/13/2025, 80-90% improved       Follow up in 3 mo          All questions and concerns were answered and addressed.  The patient expressed understanding and agrees with the plan.     Diego Irby MD    Scribe Attestation  By signing my name below, Ania STEPHEN, Scribe   attest that this documentation has been prepared under the direction and in the presence of Diego Irby MD.         [1]   Family History  Problem Relation Name Age of Onset    Hyperlipidemia Mother Brigitte Nuria     Hypertension Mother Brigitte " Nuria     Other (Cardiac disorder) Mother Brigitte Nuria     Heart attack Mother Brigitte Nuria     Raynaud syndrome Mother Brigitte Nuria     Sjogren's syndrome Mother Brigitte Nuria     Arthritis Mother Brigitte Nuria     Miscarriages / Stillbirths Mother Brigitte Nuria     Heart attack Father      Other (Other) Father      Atrial fibrillation Sister Christina Nuria

## 2025-05-19 DIAGNOSIS — G25.81 RESTLESS LEGS: ICD-10-CM

## 2025-05-19 DIAGNOSIS — F41.9 ANXIETY: ICD-10-CM

## 2025-05-19 RX ORDER — ESCITALOPRAM OXALATE 10 MG/1
10 TABLET ORAL NIGHTLY
Qty: 90 TABLET | Refills: 0 | Status: SHIPPED | OUTPATIENT
Start: 2025-05-19 | End: 2025-08-17

## 2025-05-19 RX ORDER — GABAPENTIN 100 MG/1
200 CAPSULE ORAL NIGHTLY
Qty: 200 CAPSULE | Refills: 0 | Status: SHIPPED | OUTPATIENT
Start: 2025-05-19

## 2025-05-20 ENCOUNTER — ANTICOAGULATION - WARFARIN VISIT (OUTPATIENT)
Dept: PRIMARY CARE | Facility: CLINIC | Age: 75
End: 2025-05-20
Payer: MEDICARE

## 2025-05-27 ENCOUNTER — ANTICOAGULATION - WARFARIN VISIT (OUTPATIENT)
Dept: PRIMARY CARE | Facility: CLINIC | Age: 75
End: 2025-05-27
Payer: MEDICARE

## 2025-05-28 DIAGNOSIS — K21.9 GASTROESOPHAGEAL REFLUX DISEASE, UNSPECIFIED WHETHER ESOPHAGITIS PRESENT: ICD-10-CM

## 2025-05-28 DIAGNOSIS — E78.2 MIXED HYPERLIPIDEMIA: ICD-10-CM

## 2025-05-29 RX ORDER — PANTOPRAZOLE SODIUM 40 MG/1
40 TABLET, DELAYED RELEASE ORAL
Qty: 90 TABLET | Refills: 0 | Status: SHIPPED | OUTPATIENT
Start: 2025-05-29

## 2025-05-29 RX ORDER — LOVASTATIN 40 MG/1
40 TABLET ORAL NIGHTLY
Qty: 90 TABLET | Refills: 0 | Status: SHIPPED | OUTPATIENT
Start: 2025-05-29

## 2025-06-03 ENCOUNTER — ANTICOAGULATION - WARFARIN VISIT (OUTPATIENT)
Dept: PRIMARY CARE | Facility: CLINIC | Age: 75
End: 2025-06-03
Payer: MEDICARE

## 2025-06-10 ENCOUNTER — ANTICOAGULATION - WARFARIN VISIT (OUTPATIENT)
Dept: PRIMARY CARE | Facility: CLINIC | Age: 75
End: 2025-06-10
Payer: MEDICARE

## 2025-06-17 ENCOUNTER — ANTICOAGULATION - WARFARIN VISIT (OUTPATIENT)
Dept: PRIMARY CARE | Facility: CLINIC | Age: 75
End: 2025-06-17
Payer: MEDICARE

## 2025-06-24 ENCOUNTER — ANTICOAGULATION - WARFARIN VISIT (OUTPATIENT)
Dept: PRIMARY CARE | Facility: CLINIC | Age: 75
End: 2025-06-24

## 2025-06-24 ENCOUNTER — APPOINTMENT (OUTPATIENT)
Dept: PRIMARY CARE | Facility: CLINIC | Age: 75
End: 2025-06-24
Payer: MEDICARE

## 2025-06-24 VITALS
TEMPERATURE: 97.1 F | SYSTOLIC BLOOD PRESSURE: 109 MMHG | HEIGHT: 63 IN | BODY MASS INDEX: 31.1 KG/M2 | DIASTOLIC BLOOD PRESSURE: 66 MMHG | WEIGHT: 175.5 LBS | HEART RATE: 58 BPM | OXYGEN SATURATION: 96 %

## 2025-06-24 DIAGNOSIS — B36.0 TINEA VERSICOLOR: ICD-10-CM

## 2025-06-24 DIAGNOSIS — I48.0 PAROXYSMAL ATRIAL FIBRILLATION (MULTI): ICD-10-CM

## 2025-06-24 DIAGNOSIS — M25.551 BILATERAL HIP PAIN: ICD-10-CM

## 2025-06-24 DIAGNOSIS — Z12.31 BREAST CANCER SCREENING BY MAMMOGRAM: ICD-10-CM

## 2025-06-24 DIAGNOSIS — Z00.00 MEDICARE ANNUAL WELLNESS VISIT, SUBSEQUENT: Primary | ICD-10-CM

## 2025-06-24 DIAGNOSIS — M25.552 BILATERAL HIP PAIN: ICD-10-CM

## 2025-06-24 DIAGNOSIS — F41.9 ANXIETY: ICD-10-CM

## 2025-06-24 DIAGNOSIS — M1A.0410 IDIOPATHIC CHRONIC GOUT OF RIGHT HAND WITHOUT TOPHUS: ICD-10-CM

## 2025-06-24 DIAGNOSIS — E78.2 MIXED HYPERLIPIDEMIA: ICD-10-CM

## 2025-06-24 LAB
INR IN PPP BY COAGULATION ASSAY EXTERNAL: 2.9
PROTHROMBIN TIME (PT) IN PPP BY COAGULATION ASSAY EXTERNAL: NORMAL

## 2025-06-24 PROCEDURE — 1124F ACP DISCUSS-NO DSCNMKR DOCD: CPT | Performed by: FAMILY MEDICINE

## 2025-06-24 PROCEDURE — G0439 PPPS, SUBSEQ VISIT: HCPCS | Performed by: FAMILY MEDICINE

## 2025-06-24 PROCEDURE — 1170F FXNL STATUS ASSESSED: CPT | Performed by: FAMILY MEDICINE

## 2025-06-24 PROCEDURE — 3008F BODY MASS INDEX DOCD: CPT | Performed by: FAMILY MEDICINE

## 2025-06-24 PROCEDURE — 1159F MED LIST DOCD IN RCRD: CPT | Performed by: FAMILY MEDICINE

## 2025-06-24 PROCEDURE — 99214 OFFICE O/P EST MOD 30 MIN: CPT | Performed by: FAMILY MEDICINE

## 2025-06-24 PROCEDURE — 1036F TOBACCO NON-USER: CPT | Performed by: FAMILY MEDICINE

## 2025-06-24 PROCEDURE — 1160F RVW MEDS BY RX/DR IN RCRD: CPT | Performed by: FAMILY MEDICINE

## 2025-06-24 RX ORDER — ESCITALOPRAM OXALATE 5 MG/1
5 TABLET ORAL NIGHTLY
Qty: 100 TABLET | Refills: 1 | Status: SHIPPED | OUTPATIENT
Start: 2025-06-24

## 2025-06-24 RX ORDER — KETOCONAZOLE 20 MG/G
CREAM TOPICAL 2 TIMES DAILY
Qty: 30 G | Refills: 0 | Status: SHIPPED | OUTPATIENT
Start: 2025-06-24

## 2025-06-24 ASSESSMENT — PATIENT HEALTH QUESTIONNAIRE - PHQ9
SUM OF ALL RESPONSES TO PHQ9 QUESTIONS 1 AND 2: 0
1. LITTLE INTEREST OR PLEASURE IN DOING THINGS: NOT AT ALL
2. FEELING DOWN, DEPRESSED OR HOPELESS: NOT AT ALL

## 2025-06-24 ASSESSMENT — ENCOUNTER SYMPTOMS
OCCASIONAL FEELINGS OF UNSTEADINESS: 0
NERVOUS/ANXIOUS: 1
LOSS OF SENSATION IN FEET: 0
DEPRESSION: 0

## 2025-06-24 ASSESSMENT — ACTIVITIES OF DAILY LIVING (ADL)
GROCERY_SHOPPING: INDEPENDENT
MANAGING_FINANCES: INDEPENDENT
DRESSING: INDEPENDENT
DOING_HOUSEWORK: INDEPENDENT
BATHING: INDEPENDENT
TAKING_MEDICATION: INDEPENDENT

## 2025-06-24 NOTE — ASSESSMENT & PLAN NOTE
Orders:    Comprehensive Metabolic Panel; Future    Lipid Panel; Future    Follow Up In Primary Care - Established; Future

## 2025-06-24 NOTE — PATIENT INSTRUCTIONS
Dermatology  Dr. Margarette Talbert, Yfn Sampson CNP PhD, et al. ([place]) [phone]    [facility]  Dr. Ciaran Prieto NP-C  Shayy Colón PA-C  Rosmery Blankenship FNP-C  [address] [place] [address]  [phone]    Dr. Molly Portillo PA-C  Kyra Cavazos NP-C  [address], [place]  [phone]    [facility]  [address], [address]  [place] [address]  [phone]    [address], [address]  [place], [place] [id number] [phone]    [address], [address]  [place], [place] [id number] [phone]    Please return for a(n) cholesterol, anxiety, uric acid, and medication follow-up appointment in 6 months, after tests to review results and options, earlier if any question or concern. Please return for your next Wellness visit in 12 months. Please return for a follow-up appointment after 6 weeks of physical therapy. Please schedule additional problem-focused appointment(s) to address additional problem(s). Simply mentioning or talking briefly about a problem or concern does not necessarily mean it is currently being addressed. Time constraints dictate that not every problem/concern can always be addressed.    Avoid taking Biotin (a vitamin, shows up particularly in hair/nail supplements) for a week prior to any blood tests, as it can interfere with certain results. Fasting for labs means 12 hours, nothing to eat or drink, except water and medications, unless directed otherwise.    For assistance with scheduling referrals or consultations, please call [phone]. For laboratory, radiology, and other tests, please call [phone] ([phone] for pediatrics). Please review prescription inserts and published information for possible adverse effects of all medications. Return after testing or consultation to review results and recommendations, if symptoms persist, change, worsen, or return, or if you have any  question or concern. If you do not get results within 7-10 days, or you have any question or concern, please send a message, call the office ([phone]), or return to the office for a follow-up appointment. For non-emergencies, you may call the office. For emergencies, call 9-1-1 or go to the nearest Emergency Department. Please schedule additional appointment(s) to address concern(s) not addressed today. An annual Wellness visit is strongly recommended. A Wellness visit should be dedicated to addressing routine health maintenance matters (e.g., cancer screenings, cardiovascular screening, etc.). Problem-focused visits, typically prompted by symptoms or specific concerns, are usually conducted separately, particularly if multiple or complex problems need to be addressed.    In general, results are not released or discussed over the telephone, but at an appointment or via  My eStore App. Results of tests done through [facility] are released via  My eStore App (see below).  [email or web address]   My eStore App support line: [phone]

## 2025-06-24 NOTE — PROGRESS NOTES
Subjective   Reason for Visit: Mariposa Comer is an 74 y.o. female here for Medicare Annual Wellness Visit Subsequent (Pt presents for MCV, med review, c/o bi lateral hip pain, no refills needed, INR was 2.9 today pt states been really bad eating a lot of salads.)     Past Medical, Surgical, and Family History reviewed and updated in chart.    Reviewed all medications by prescribing practitioner or clinical pharmacist (such as prescriptions, OTCs, herbal therapies and supplements) and documented in the medical record.    HPI  Historian(s): Self    Generally feeling well.  c/o bilateral hip/greater trochanter area pain, difficulty to lay in lateral recumbent position.  Patient does not use opioids.    c/o itchy spot back of neck for quite a while. Tried cortisone, helped a little. Feels a little bumpy there.    Family History[1]    Patient Care Team:  Minh Lopez DO as PCP - General (Family Medicine)  Minh Lopez DO as PCP - tna Medicare Advantage PCP  Ciaran Garrido MD as Consulting Physician (Cardiology)  Whitney Mayorga MD as Consulting Physician (Cardiology)  Luis Eduardo Stafford MD as Referring Physician (Pulmonary Disease)  Jessica Tapia MD as Referring Physician (Gastroenterology)  Marlene Acosta DO as Obstetrician (Obstetrics and Gynecology)  Diego Irby MD as Surgeon (Obstetrics and Gynecology)     Review of Systems   Psychiatric/Behavioral:  The patient is nervous/anxious.    All other systems reviewed and are negative.      Current Outpatient Medications   Medication Instructions    albuterol 90 mcg/actuation inhaler 2 puffs, Every 4 hours PRN    calcium carbonate-vitamin D3 1,000 mg-20 mcg (800 unit) tablet 1 Capful    coenzyme Q10 400 mg, Daily with breakfast    escitalopram (LEXAPRO) 5 mg, oral, Nightly, Pt states she cuts in half to 5mg    fluticasone (Flonase) 50 mcg/actuation nasal spray 2 sprays, Each Nostril, Daily    gabapentin (NEURONTIN) 200 mg, oral, Nightly,  "TAKE 1 CAPSULE in the morning, 1 capsule at noon and 2 capsules at bedtime    hydrocortisone 2.5 % cream Topical, 2 times daily PRN    ketoconazole (NIZOral) 2 % cream Topical, 2 times daily, For 2-3 weeks.    levocetirizine (XYZAL) 5 mg    lovastatin (MEVACOR) 40 mg, oral, Nightly    methenamine hippurate (HIPREX) 1 g, oral, 2 times daily    mv,calcium,min/iron/folic/vitK (MULTI FOR HER ORAL) Take by mouth.    pantoprazole (PROTONIX) 40 mg, oral, Daily before breakfast, TAKE 1 TABLET Daily 30 minutes before breakfast    prothrombin time/INR test metr misc Use to test your INR 1-2x weekly, or as instructed.    psyllium (Metamucil, with sugar,) 3.4 gram packet 1 packet, Daily    warfarin (Coumadin) 1 mg tablet Take 2 tablets daily. Total daily dose 5 mg. Or as directed.    warfarin (Coumadin) 6 mg tablet Take half tablet once daily. Total daily dose 5 mg.       Objective   Vitals:  /66   Pulse 58   Temp 36.2 °C (97.1 °F)   Ht 1.6 m (5' 3\")   Wt 79.6 kg (175 lb 8 oz)   SpO2 96%   BMI 31.09 kg/m²       Lab Results   Component Value Date    LDLF 89 07/25/2023    LDLF 102 (H) 11/04/2021    LDLF 93 05/13/2020     Lab Results   Component Value Date    TRIG 103 07/25/2023    TRIG 119 11/04/2021    TRIG 126 05/13/2020      Lab Results   Component Value Date    CREATININE 0.74 09/05/2024    CREATININE 0.73 07/25/2023    CREATININE 0.82 11/04/2021    CREATININE 0.78 10/01/2020    CREATININE 0.83 08/06/2020    CREATININE 0.65 08/05/2020    CREATININE 0.84 08/03/2020    CREATININE 0.84 05/13/2020    CREATININE 0.73 01/03/2020    CREATININE 0.82 12/10/2019     Lab Results   Component Value Date    EGFR 87 09/05/2024    GFRF 88 07/25/2023      Lab Results   Component Value Date    TSH 0.93 11/04/2021    TSH 0.83 10/01/2020    TSH 0.84 10/01/2020    TSH 1.12 12/10/2019          Physical Exam  Vitals and nursing note reviewed.   Constitutional:       General: She is not in acute distress.     Appearance: Normal " appearance. She is not diaphoretic.      Comments: No assistive device presently being used.   HENT:      Head: Normocephalic and atraumatic.   Eyes:      General: No scleral icterus.     Extraocular Movements: Extraocular movements intact.      Conjunctiva/sclera: Conjunctivae normal.   Cardiovascular:      Rate and Rhythm: Normal rate and regular rhythm.      Heart sounds: Normal heart sounds.   Pulmonary:      Effort: Pulmonary effort is normal. No respiratory distress.      Breath sounds: Normal breath sounds.   Abdominal:      General: Bowel sounds are normal. There is no distension.      Palpations: Abdomen is soft. There is no mass.      Tenderness: There is no abdominal tenderness. There is no guarding or rebound.   Musculoskeletal:         General: Tenderness (along bilateral gluteus muscles laterally, and near greater trochanter, no pain with rotation of hip, left hip significantly rotation) present.      Right lower leg: No edema.      Left lower leg: No edema.   Skin:     General: Skin is warm and dry.      Coloration: Skin is not jaundiced.      Findings: Lesion (12 x 14 mm tinea versicolor right upper back) present.   Neurological:      General: No focal deficit present.      Mental Status: She is alert and oriented to person, place, and time. Mental status is at baseline.   Psychiatric:         Mood and Affect: Mood normal.         Behavior: Behavior normal.         Thought Content: Thought content normal.         Assessment & Plan  Medicare annual wellness visit, subsequent  74yF doing fairly well. Reports last colonoscopy done 5y ago, and due at 5-10y.       Anxiety  Well controlled.   Orders:    escitalopram (Lexapro) 5 mg tablet; Take 1 tablet (5 mg) by mouth once daily at bedtime. Pt states she cuts in half to 5mg    Follow Up In Primary Care - Established; Future    Breast cancer screening by mammogram    Orders:    BI mammo bilateral screening tomosynthesis; Future    Paroxysmal atrial  fibrillation (Multi)  INR consistently therapeutic.       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 DDx: small inflamed cutaneous cyst. Dermatology if not cleared up within a couple weeks.  Orders:    ketoconazole (NIZOral) 2 % cream; Apply topically 2 times a day. For 2-3 weeks.    Bilateral hip pain  Suspect gluteal tendinopathy. DDx: greater trochanteric bursitis, hip arthropathy. PT, may consider ortho referral.  Orders:    Referral to Physical Therapy; Future            Orders Placed This Encounter   Procedures    BI mammo bilateral screening tomosynthesis     Standing Status:   Future     Expected Date:   10/28/2025     Expiration Date:   12/24/2025     Reason for exam::   screening     Radiologist to Determine Optimal Study:   Yes     What is the patient's preferred location?:   No Preference     Release result to [facility]:   Immediate    Comprehensive Metabolic Panel     Standing Status:   Future     Expected Date:   12/24/2025     Expiration Date:   6/24/2026     Release result to [facility]:   Immediate [1]    Lipid Panel     Standing Status:   Future     Expected Date:   12/24/2025     Expiration Date:   6/24/2026     [facility]   Immediate [1]    Uric Acid     Standing Status:   Future     Expected Date:   12/24/2025     Expiration Date:   6/24/2026     Release result to [facility]:   Immediate [1]    Referral to Physical Therapy     Standing Status:   Future     Expected Date:   6/24/2025     Expiration Date:   6/24/2026     Referral Priority:   Routine     Referral Type:   Consultation     Referral Reason:   Specialty Services Required     Requested Specialty:   Physical Therapy     Number of Visits Requested:   1                [1]   Family History  Problem Relation Name Age of Onset    Hyperlipidemia Mother Brigitte Nuria     Hypertension Mother Brigitte Nuria     Other (Cardiac disorder) Mother Brigitte Nuria     Heart attack Mother Brigitte Nuria     Raynaud syndrome Mother Brigitte Nuria     Sjogren's syndrome Mother Brigitte Nuria     Arthritis Mother Brigitte Nuria     Miscarriages / Stillbirths Mother Brigitte Nuria     Heart attack Father      Other (Other) Father      Atrial fibrillation Sister Christina Nuria

## 2025-06-24 NOTE — ASSESSMENT & PLAN NOTE
Well controlled.   Orders:    escitalopram (Lexapro) 5 mg tablet; Take 1 tablet (5 mg) by mouth once daily at bedtime. Pt states she cuts in half to 5mg    Follow Up In Primary Care - Established; Future

## 2025-07-01 ENCOUNTER — ANTICOAGULATION - WARFARIN VISIT (OUTPATIENT)
Dept: PRIMARY CARE | Facility: CLINIC | Age: 75
End: 2025-07-01
Payer: MEDICARE

## 2025-07-08 ENCOUNTER — ANTICOAGULATION - WARFARIN VISIT (OUTPATIENT)
Dept: PRIMARY CARE | Facility: CLINIC | Age: 75
End: 2025-07-08
Payer: MEDICARE

## 2025-07-15 ENCOUNTER — ANTICOAGULATION - WARFARIN VISIT (OUTPATIENT)
Dept: PRIMARY CARE | Facility: CLINIC | Age: 75
End: 2025-07-15
Payer: MEDICARE

## 2025-07-22 ENCOUNTER — ANTICOAGULATION - WARFARIN VISIT (OUTPATIENT)
Dept: PRIMARY CARE | Facility: CLINIC | Age: 75
End: 2025-07-22
Payer: MEDICARE

## 2025-07-30 ENCOUNTER — ANTICOAGULATION - WARFARIN VISIT (OUTPATIENT)
Dept: PRIMARY CARE | Facility: CLINIC | Age: 75
End: 2025-07-30
Payer: MEDICARE

## 2025-08-05 LAB
INR IN PPP BY COAGULATION ASSAY EXTERNAL: 3.3
PROTHROMBIN TIME (PT) IN PPP BY COAGULATION ASSAY EXTERNAL: NORMAL

## 2025-08-06 ENCOUNTER — ANTICOAGULATION - WARFARIN VISIT (OUTPATIENT)
Dept: PRIMARY CARE | Facility: CLINIC | Age: 75
End: 2025-08-06
Payer: MEDICARE

## 2025-08-12 ENCOUNTER — ANTICOAGULATION - WARFARIN VISIT (OUTPATIENT)
Dept: PRIMARY CARE | Facility: CLINIC | Age: 75
End: 2025-08-12
Payer: MEDICARE

## 2025-08-12 LAB
INR IN PPP BY COAGULATION ASSAY EXTERNAL: 1.9
PROTHROMBIN TIME (PT) IN PPP BY COAGULATION ASSAY EXTERNAL: NORMAL

## 2025-08-15 DIAGNOSIS — I48.0 PAROXYSMAL ATRIAL FIBRILLATION (MULTI): ICD-10-CM

## 2025-08-15 RX ORDER — WARFARIN 1 MG/1
TABLET ORAL
Qty: 180 TABLET | Refills: 1 | Status: SHIPPED | OUTPATIENT
Start: 2025-08-15

## 2025-08-16 DIAGNOSIS — F41.9 ANXIETY: ICD-10-CM

## 2025-08-18 RX ORDER — ESCITALOPRAM OXALATE 10 MG/1
10 TABLET ORAL NIGHTLY
Qty: 90 TABLET | Refills: 0 | Status: SHIPPED | OUTPATIENT
Start: 2025-08-18 | End: 2025-11-16

## 2025-08-19 ENCOUNTER — ANTICOAGULATION - WARFARIN VISIT (OUTPATIENT)
Dept: PRIMARY CARE | Facility: CLINIC | Age: 75
End: 2025-08-19
Payer: MEDICARE

## 2025-08-21 ENCOUNTER — APPOINTMENT (OUTPATIENT)
Dept: UROLOGY | Facility: CLINIC | Age: 75
End: 2025-08-21
Payer: MEDICARE

## 2025-08-21 DIAGNOSIS — N95.8 GENITOURINARY SYNDROME OF MENOPAUSE: Primary | ICD-10-CM

## 2025-08-21 DIAGNOSIS — R15.9 INCONTINENCE OF FECES, UNSPECIFIED FECAL INCONTINENCE TYPE: ICD-10-CM

## 2025-08-21 DIAGNOSIS — N39.0 RECURRENT UTI: ICD-10-CM

## 2025-08-21 PROCEDURE — 99213 OFFICE O/P EST LOW 20 MIN: CPT | Performed by: STUDENT IN AN ORGANIZED HEALTH CARE EDUCATION/TRAINING PROGRAM

## 2025-08-27 ENCOUNTER — ANTICOAGULATION - WARFARIN VISIT (OUTPATIENT)
Dept: PRIMARY CARE | Facility: CLINIC | Age: 75
End: 2025-08-27
Payer: MEDICARE

## 2025-08-28 DIAGNOSIS — E78.2 MIXED HYPERLIPIDEMIA: ICD-10-CM

## 2025-08-28 DIAGNOSIS — K21.9 GASTROESOPHAGEAL REFLUX DISEASE, UNSPECIFIED WHETHER ESOPHAGITIS PRESENT: ICD-10-CM

## 2025-08-28 RX ORDER — LOVASTATIN 40 MG/1
40 TABLET ORAL NIGHTLY
Qty: 90 TABLET | Refills: 1 | Status: SHIPPED | OUTPATIENT
Start: 2025-08-28

## 2025-08-28 RX ORDER — PANTOPRAZOLE SODIUM 40 MG/1
40 TABLET, DELAYED RELEASE ORAL
Qty: 90 TABLET | Refills: 1 | Status: SHIPPED | OUTPATIENT
Start: 2025-08-28

## 2025-09-02 ENCOUNTER — ANTICOAGULATION - WARFARIN VISIT (OUTPATIENT)
Dept: PRIMARY CARE | Facility: CLINIC | Age: 75
End: 2025-09-02
Payer: MEDICARE

## 2025-11-24 ENCOUNTER — APPOINTMENT (OUTPATIENT)
Dept: UROLOGY | Facility: CLINIC | Age: 75
End: 2025-11-24
Payer: MEDICARE

## 2025-12-11 ENCOUNTER — APPOINTMENT (OUTPATIENT)
Dept: PRIMARY CARE | Facility: CLINIC | Age: 75
End: 2025-12-11
Payer: MEDICARE